# Patient Record
Sex: MALE | Race: WHITE | Employment: OTHER | URBAN - METROPOLITAN AREA
[De-identification: names, ages, dates, MRNs, and addresses within clinical notes are randomized per-mention and may not be internally consistent; named-entity substitution may affect disease eponyms.]

---

## 2019-11-05 LAB
ALBUMIN: 4.4 G/DL (ref 3.4–5)
ALP BLD-CCNC: 56 U/L (ref 33–136)
ALT SERPL-CCNC: 22 U/L (ref 10–52)
ANION GAP SERPL CALCULATED.3IONS-SCNC: 12 MMOL/L (ref 10–20)
AST SERPL-CCNC: 20 U/L (ref 9–39)
BICARBONATE: 28 MMOL/L (ref 21–32)
BILIRUB SERPL-MCNC: 0.6 MG/DL (ref 0–1.2)
CALCIUM SERPL-MCNC: 9.7 MG/DL (ref 8.6–10.3)
CHLORIDE BLD-SCNC: 104 MMOL/L (ref 98–107)
CHOLESTEROL/HDL RATIO: 3.3
CHOLESTEROL: 185 MG/DL (ref 0–199)
CREAT SERPL-MCNC: 1.14 MG/DL (ref 0.5–1.3)
GFR AFRICAN AMERICAN: >60 ML/MIN/1.73M2
GFR NON-AFRICAN AMERICAN: >60 ML/MIN/1.73M2
GLUCOSE: 101 MG/DL (ref 74–99)
HDLC SERPL-MCNC: 56 MG/DL
LDL CHOLESTEROL: 78 MG/DL (ref 0–99)
NON-HDL CHOLESTEROL: 129 MG/DL
POTASSIUM SERPL-SCNC: 4 MMOL/L (ref 3.5–5.3)
SODIUM BLD-SCNC: 140 MMOL/L (ref 136–145)
TOTAL PROTEIN: 7.5 G/DL (ref 6.4–8.2)
TRIGL SERPL-MCNC: 257 MG/DL (ref 0–149)
UREA NITROGEN: 18 MG/DL (ref 6–23)
VLDLC SERPL CALC-MCNC: 51 MG/DL (ref 0–40)

## 2020-11-18 ENCOUNTER — VIRTUAL VISIT (OUTPATIENT)
Dept: FAMILY MEDICINE CLINIC | Age: 80
End: 2020-11-18
Payer: MEDICARE

## 2020-11-18 PROCEDURE — 99441 PR PHYS/QHP TELEPHONE EVALUATION 5-10 MIN: CPT | Performed by: NURSE PRACTITIONER

## 2020-11-18 RX ORDER — AZITHROMYCIN 250 MG/1
250 TABLET, FILM COATED ORAL SEE ADMIN INSTRUCTIONS
Qty: 6 TABLET | Refills: 0 | Status: SHIPPED | OUTPATIENT
Start: 2020-11-18 | End: 2020-11-23

## 2020-11-18 RX ORDER — BENZONATATE 200 MG/1
200 CAPSULE ORAL 3 TIMES DAILY PRN
Qty: 30 CAPSULE | Refills: 0 | Status: SHIPPED | OUTPATIENT
Start: 2020-11-18 | End: 2020-11-25

## 2020-11-18 ASSESSMENT — ENCOUNTER SYMPTOMS
RHINORRHEA: 0
DIARRHEA: 0
SORE THROAT: 0
WHEEZING: 1
SHORTNESS OF BREATH: 0
VOMITING: 0
COUGH: 1
NAUSEA: 0

## 2020-11-18 NOTE — PROGRESS NOTES
of chest colds over the years   Hasnt taken his temp        Review of Systems   Constitutional: Positive for fatigue. Negative for chills and fever. HENT: Negative for congestion, rhinorrhea and sore throat. Respiratory: Positive for cough and wheezing (maybe a tiny bit). Negative for shortness of breath. Gastrointestinal: Negative for diarrhea, nausea and vomiting. Musculoskeletal: Negative for myalgias. Skin: Negative for rash. Neurological: Negative for headaches. Prior to Visit Medications    Medication Sig Taking? Authorizing Provider   azithromycin (ZITHROMAX) 250 MG tablet Take 1 tablet by mouth See Admin Instructions for 5 days 500mg on day 1 followed by 250mg on days 2 - 5 Yes ADAN Ledbetter CNP   benzonatate (TESSALON) 200 MG capsule Take 1 capsule by mouth 3 times daily as needed for Cough Yes ADAN Parnell CNP       No past medical history on file. No past surgical history on file.   Social History     Socioeconomic History    Marital status:      Spouse name: Not on file    Number of children: Not on file    Years of education: Not on file    Highest education level: Not on file   Occupational History    Not on file   Social Needs    Financial resource strain: Not on file    Food insecurity     Worry: Not on file     Inability: Not on file    Transportation needs     Medical: Not on file     Non-medical: Not on file   Tobacco Use    Smoking status: Not on file   Substance and Sexual Activity    Alcohol use: Not on file    Drug use: Not on file    Sexual activity: Not on file   Lifestyle    Physical activity     Days per week: Not on file     Minutes per session: Not on file    Stress: Not on file   Relationships    Social connections     Talks on phone: Not on file     Gets together: Not on file     Attends Sikh service: Not on file     Active member of club or organization: Not on file     Attends meetings of clubs or organizations: Not on file     Relationship status: Not on file    Intimate partner violence     Fear of current or ex partner: Not on file     Emotionally abused: Not on file     Physically abused: Not on file     Forced sexual activity: Not on file   Other Topics Concern    Not on file   Social History Narrative    Not on file     No family history on file. No Known Allergies    PMH, Surgical Hx, Family Hx, and Social Hx reviewed and updated. Health Maintenance reviewed. PHYSICAL EXAMINATION:  \"[x]\" Indicates a positive item  \"[]\" Indicates a negative item    Vital Signs: (As obtained by patient/caregiver or practitioner observation)    Blood pressure-  Heart rate-    Respiratory rate-    Temperature-  Pulse oximetry-     Constitutional: [] Appears well-developed and well-nourished [x] No apparent distress      [] Abnormal-   Mental status  [x] Alert and awake  [x] Oriented to person/place/time [x]Able to follow commands      Eyes:  EOM    []  Normal  [] Abnormal-  Sclera  []  Normal  [] Abnormal -         Discharge []  None visible  [] Abnormal -    HENT:   [] Normocephalic, atraumatic.   [] Abnormal   [] Mouth/Throat: Mucous membranes are moist.     External Ears [] Normal  [] Abnormal-     Neck: [] No visualized mass     Pulmonary/Chest: [] Respiratory effort normal.  [] No visualized signs of difficulty breathing or respiratory distress        [] Abnormal-      Musculoskeletal:   [] Normal gait with no signs of ataxia         [] Normal range of motion of neck        [] Abnormal-       Neurological:       [] No Facial Asymmetry (Cranial nerve 7 motor function) (limited exam to video visit)          [] No gaze palsy        [] Abnormal-         Skin:        [] No significant exanthematous lesions or discoloration noted on facial skin         [] Abnormal-            Psychiatric:       [x] Normal Affect [x] No Hallucinations        [] Abnormal-     Other pertinent observable physical exam findings-   Results

## 2020-11-19 ASSESSMENT — PATIENT HEALTH QUESTIONNAIRE - PHQ9
2. FEELING DOWN, DEPRESSED OR HOPELESS: 0
SUM OF ALL RESPONSES TO PHQ9 QUESTIONS 1 & 2: 0
1. LITTLE INTEREST OR PLEASURE IN DOING THINGS: 0
SUM OF ALL RESPONSES TO PHQ QUESTIONS 1-9: 0

## 2021-07-01 ENCOUNTER — HOSPITAL ENCOUNTER (OUTPATIENT)
Dept: PULMONOLOGY | Age: 81
Discharge: HOME | End: 2021-07-01
Payer: MEDICARE

## 2021-07-01 DIAGNOSIS — R06.02 SHORTNESS OF BREATH: ICD-10-CM

## 2021-07-01 PROCEDURE — 94726 PLETHYSMOGRAPHY LUNG VOLUMES: CPT

## 2021-07-01 PROCEDURE — 94060 EVALUATION OF WHEEZING: CPT

## 2021-07-01 PROCEDURE — 94729 DIFFUSING CAPACITY: CPT

## 2021-07-01 PROCEDURE — 6360000002 HC RX W HCPCS: Performed by: NURSE PRACTITIONER

## 2021-07-01 RX ORDER — ALBUTEROL SULFATE 2.5 MG/3ML
2.5 SOLUTION RESPIRATORY (INHALATION) ONCE
Status: COMPLETED | OUTPATIENT
Start: 2021-07-01 | End: 2021-07-01

## 2021-07-01 RX ADMIN — ALBUTEROL SULFATE 2.5 MG: 2.5 SOLUTION RESPIRATORY (INHALATION) at 13:49

## 2021-07-02 PROCEDURE — 94060 EVALUATION OF WHEEZING: CPT | Performed by: INTERNAL MEDICINE

## 2021-07-02 PROCEDURE — 94726 PLETHYSMOGRAPHY LUNG VOLUMES: CPT | Performed by: INTERNAL MEDICINE

## 2021-07-02 PROCEDURE — 94729 DIFFUSING CAPACITY: CPT | Performed by: INTERNAL MEDICINE

## 2021-07-02 NOTE — PROCEDURES
[address]                      [facility], [id number] [place] [address]                    PULMONARY FUNCTION  Ventura David   80 y.o.   male  Height 73 in  Weight 205 lb      Referring provider   ADAN Marcus Chi - *    Reading provider   Alon Jensen MD    Test meets ATS criteria for acceptability and reproducibility Yes    Diagnosis: LAMBERT: Yes  Cough   Yes, wheezing Yes  Smoking   quit 20 years ago    Spirometry   FVC            4.18 L   91%  Post bronchodilator 4.48 L  97% Change 7 %  FEV1          2.69 L  81%   Post bronchodilator  2.86 L  86%   Change 6%  FEV1/FVC  64  %             Post bronchodilator  63 %  EEV92-97% 1.31 L  56%  Post bronchodilator  1.72 L  74%   Change 30%    Lung volume   SVC           4.22 L  92%   RV              3.40 L 121%   TLC            7.62  L 99%   RV/TLC      44 %    DLCO           95 %     Test interpretation     Spirometry meets ATS criteria for mild obstructive airway disease with no significant response to bronchodilator,  Lung volume shows mild air trapping  Diffusion capacity is normal.       Clinical correlation is recommended     Alon Jensen MD [facility], 7/2/2021 5:30 PM

## 2021-07-22 ENCOUNTER — HOSPITAL ENCOUNTER (OUTPATIENT)
Dept: GENERAL RADIOLOGY | Age: 81
Discharge: HOME | End: 2021-07-24
Payer: MEDICARE

## 2021-07-22 ENCOUNTER — OFFICE VISIT (OUTPATIENT)
Dept: PULMONOLOGY | Age: 81
End: 2021-07-22
Payer: MEDICARE

## 2021-07-22 VITALS
HEIGHT: 70 IN | SYSTOLIC BLOOD PRESSURE: 132 MMHG | HEART RATE: 62 BPM | DIASTOLIC BLOOD PRESSURE: 66 MMHG | OXYGEN SATURATION: 98 % | BODY MASS INDEX: 30.29 KG/M2 | TEMPERATURE: 97.8 F | WEIGHT: 211.6 LBS

## 2021-07-22 DIAGNOSIS — E66.9 OBESITY (BMI 30-39.9): ICD-10-CM

## 2021-07-22 DIAGNOSIS — J44.9 CHRONIC OBSTRUCTIVE PULMONARY DISEASE, UNSPECIFIED COPD TYPE (HCC): Primary | ICD-10-CM

## 2021-07-22 DIAGNOSIS — Z87.891 PERSONAL HISTORY OF TOBACCO USE: ICD-10-CM

## 2021-07-22 DIAGNOSIS — J44.9 CHRONIC OBSTRUCTIVE PULMONARY DISEASE, UNSPECIFIED COPD TYPE (HCC): ICD-10-CM

## 2021-07-22 PROCEDURE — 4004F PT TOBACCO SCREEN RCVD TLK: CPT | Performed by: INTERNAL MEDICINE

## 2021-07-22 PROCEDURE — 3023F SPIROM DOC REV: CPT | Performed by: INTERNAL MEDICINE

## 2021-07-22 PROCEDURE — 99204 OFFICE O/P NEW MOD 45 MIN: CPT | Performed by: INTERNAL MEDICINE

## 2021-07-22 PROCEDURE — G8417 CALC BMI ABV UP PARAM F/U: HCPCS | Performed by: INTERNAL MEDICINE

## 2021-07-22 PROCEDURE — 71046 X-RAY EXAM CHEST 2 VIEWS: CPT

## 2021-07-22 PROCEDURE — 1123F ACP DISCUSS/DSCN MKR DOCD: CPT | Performed by: INTERNAL MEDICINE

## 2021-07-22 PROCEDURE — G8427 DOCREV CUR MEDS BY ELIG CLIN: HCPCS | Performed by: INTERNAL MEDICINE

## 2021-07-22 PROCEDURE — G8926 SPIRO NO PERF OR DOC: HCPCS | Performed by: INTERNAL MEDICINE

## 2021-07-22 PROCEDURE — 4040F PNEUMOC VAC/ADMIN/RCVD: CPT | Performed by: INTERNAL MEDICINE

## 2021-07-22 RX ORDER — MULTIVITAMIN
TABLET ORAL
COMMUNITY

## 2021-07-22 RX ORDER — SIMVASTATIN 40 MG
40 TABLET ORAL NIGHTLY
COMMUNITY

## 2021-07-22 RX ORDER — ALBUTEROL SULFATE 2.5 MG/3ML
2.5 SOLUTION RESPIRATORY (INHALATION) EVERY 6 HOURS PRN
COMMUNITY
End: 2022-05-04 | Stop reason: SDUPTHER

## 2021-07-22 RX ORDER — ASPIRIN 325 MG
325 TABLET ORAL DAILY
COMMUNITY

## 2021-07-22 RX ORDER — CARVEDILOL 3.12 MG/1
3.12 TABLET ORAL 2 TIMES DAILY WITH MEALS
COMMUNITY

## 2021-07-22 ASSESSMENT — ENCOUNTER SYMPTOMS
SORE THROAT: 0
COUGH: 1
WHEEZING: 1
RHINORRHEA: 0
SHORTNESS OF BREATH: 1
EYE ITCHING: 0
NAUSEA: 0
VOMITING: 0
DIARRHEA: 0
ABDOMINAL PAIN: 0
VOICE CHANGE: 0
CHEST TIGHTNESS: 0

## 2021-07-22 NOTE — PROGRESS NOTES
Subjective:     Will Dejesus is a 80 y.o. male who complains today of:     Chief Complaint   Patient presents with    New Patient     SOB coughing s/p covid       HPI  He had Covid19 in  November. [place]  And in dec 2020 to 3/21 in [place]. He is coughing in AM  And shortness of breath  Mostly lying flat but he feels can not take real deep breath . He also has wheezing . No h/o asthma or COPD, h/o smoking for 50 yrs . H/o smoking 1 ppd. He is on nebulizer with albuterol TID and pro air HFA prn   No Hemoptysis. C/o  Chest tightness. No Chest pain with radiation or pleuritic pain. No  leg edema. No orthopnea. No Fever or chills. No Rhinorrhea and postnasal drip. Allergies:  Patient has no known allergies. No past medical history on file. No past surgical history on file. No family history on file. Social History     Socioeconomic History    Marital status:      Spouse name: Not on file    Number of children: Not on file    Years of education: Not on file    Highest education level: Not on file   Occupational History    Not on file   Tobacco Use    Smoking status: Not on file   Substance and Sexual Activity    Alcohol use: Not on file    Drug use: Not on file    Sexual activity: Not on file   Other Topics Concern    Not on file   Social History Narrative    Not on file     Social Determinants of Health     Financial Resource Strain:     Difficulty of Paying Living Expenses:    Food Insecurity:     Worried About Running Out of Food in the Last Year:     [address] in the Last Year:    Transportation Needs:     Lack of Transportation (Medical):      Lack of Transportation (Non-Medical):    Physical Activity:     Days of Exercise per Week:     Minutes of Exercise per Session:    Stress:     Feeling of Stress :    Social Connections:     Frequency of Communication with Friends and Family:     Frequency of Social Gatherings with Friends and Family:     Attends Yarsanism Services:     Active Member of Clubs or Organizations:     Attends Club or Organization Meetings:     Marital Status:    Intimate Partner Violence:     Fear of Current or Ex-Partner:     Emotionally Abused:     Physically Abused:     Sexually Abused:          Review of Systems   Constitutional: Negative for chills, diaphoresis, fatigue and fever. HENT: Negative for congestion, mouth sores, nosebleeds, postnasal drip, rhinorrhea, sneezing, sore throat and voice change. Eyes: Negative for itching and visual disturbance. Respiratory: Positive for cough, shortness of breath and wheezing. Negative for chest tightness. Cardiovascular: Negative. Negative for chest pain, palpitations and leg swelling. Gastrointestinal: Negative for abdominal pain, diarrhea, nausea and vomiting. Genitourinary: Negative for difficulty urinating and hematuria. Musculoskeletal: Negative for arthralgias, joint swelling and myalgias. Skin: Negative for rash. Allergic/Immunologic: Negative for environmental allergies. Neurological: Negative for dizziness, tremors, weakness and headaches. Psychiatric/Behavioral: Negative for behavioral problems and sleep disturbance.         :     Vitals:    07/22/21 0850   BP: 132/66   Pulse: 62   Temp: 97.8 °F (36.6 °C)   SpO2: 98%   Weight: 211 lb 9.6 oz (96 kg)   Height: 5' 10\" (1.778 m)     Wt Readings from Last 3 Encounters:   07/22/21 211 lb 9.6 oz (96 kg)         Physical Exam  Constitutional:       Appearance: He is well-developed. HENT:      Head: Normocephalic and atraumatic. Nose: Nose normal.   Eyes:      Conjunctiva/sclera: Conjunctivae normal.      Pupils: Pupils are equal, round, and reactive to light. Neck:      Thyroid: No thyromegaly. Vascular: No JVD. Trachea: No tracheal deviation. Cardiovascular:      Rate and Rhythm: Normal rate and regular rhythm. Heart sounds: No murmur heard. No friction rub. No gallop.     Pulmonary:      Effort: Pulmonary effort is normal. No respiratory distress. Breath sounds: Normal breath sounds. No wheezing or rales. Comments: diminished Breath sound bilaterally. Chest:      Chest wall: No tenderness. Abdominal:      General: There is no distension. Musculoskeletal:         General: Normal range of motion. Lymphadenopathy:      Cervical: No cervical adenopathy. Skin:     General: Skin is warm and dry. Findings: No rash. Neurological:      Mental Status: He is alert and oriented to person, place, and time. Cranial Nerves: No cranial nerve deficit. Psychiatric:         Behavior: Behavior normal.         Current Outpatient Medications   Medication Sig Dispense Refill    carvedilol (COREG) 3.125 MG tablet Take 3.125 mg by mouth 2 times daily (with meals)      simvastatin (ZOCOR) 40 MG tablet Take 40 mg by mouth nightly      aspirin 325 MG tablet Take 325 mg by mouth daily      Multiple Vitamin (MULTI-VITAMIN DAILY) TABS Take by mouth      albuterol (PROVENTIL) (2.5 MG/3ML) 0.083% nebulizer solution Take 2.5 mg by nebulization every 6 hours as needed      Albuterol Sulfate (PROAIR HFA IN) Inhale into the lungs       No current facility-administered medications for this visit. Narrative   REASON FOR EXAMINATION  PATIENT WITH CHEST PAIN.        PORTABLE CHEST X-RAY       The heart size is at the upper limits of normal.  There is mild   tortuosity of the aorta.  There is slight hyperinflation of the lungs   which raises the possibility of mild COPD.  There is pleural and/or   parenchymal scarring in the left lung base.   There are no pulmonary   infiltrates or pleural effusions.  The remainder of the chest appears   unremarkable.        IMPRESSION  NO RADIOGRAPHIC EVIDENCE OF ACTIVE DISEASE IN THE CHEST.          -  RADWHERE        Read By- Kimberley Taylor   Released By- Kimberley Taylor   Released Date Time- 06/15/12 6783    This document has been electronically signed. Narrative    Jackie Moore MD     7/2/2021  5:32 PM                        [address]                      [address] [facility] [address]                      PULMONARY FUNCTION   Gina Boots   80 y.o.   male   Height 73 in   Weight 205 lb       Referring provider   ADAN Torres - *     Reading provider   Jackie Moore MD     Test meets ATS criteria for acceptability and reproducibility Yes     Diagnosis: LAMBERT: Yes  Cough   Yes, wheezing Yes   Smoking   quit 20 years ago     Spirometry   FVC            4.18 L   91%  Post bronchodilator 4.48 L  97%   Change 7 %   FEV1          2.69 L  81%   Post bronchodilator  2.86 L  86%     Change 6%   FEV1/FVC  64  %             Post bronchodilator  63 %   YFS83-09% 1.31 L  56%  Post bronchodilator  1.72 L  74%   Change   30%     Lung volume   SVC           4.22 L  92%   RV              3.40 L 121%   TLC            7.62  L 99%   RV/TLC      44 %     DLCO           95 %     Test interpretation       Spirometry meets ATS criteria for mild obstructive airway disease   with no significant response to bronchodilator,   Lung volume shows mild air trapping   Diffusion capacity is normal.         Clinical correlation is recommended     Jackie Moore MD [facility], 7/2/2021 5:30 PM          Assessment/Plan:     1. Chronic obstructive pulmonary disease, unspecified COPD type ([address]  He had Covid19 in  November. [place]  And in dec 2020 to 3/21 in [place]. He is coughing in AM  And shortness of breath  Mostly lying flat but he feels can not take real deep breath . He also has wheezing . C/o  Chest tightness. No h/o asthma , h/o smoking for 50 yrs . H/o smoking 1 ppd. He is on nebulizer with albuterol TID and pro air HFA prn   At this time continue bronchodilator as before , f/u after CXR and overnight pulse oxymetry  Before next visit. - Pulse oximetry, overnight; Future  - XR CHEST STANDARD (2 VW); Future    2. Personal history of tobacco use  H/o smoking for 50 yrs . H/o smoking 1 ppd. Quit 15 yrs. He was chewing tobacco, quit 3 month . 3. Obesity (BMI 30-39. 9)  Recommend PSG but does not want at this time he has daytime sleepiness, taking nap . Likely [facility]      Return in about 4 weeks (around 8/19/2021) for COPD, CXR result.       James Richard MD

## 2021-08-10 DIAGNOSIS — J44.9 CHRONIC OBSTRUCTIVE PULMONARY DISEASE, UNSPECIFIED COPD TYPE (HCC): ICD-10-CM

## 2021-08-11 ENCOUNTER — TELEPHONE (OUTPATIENT)
Dept: PULMONOLOGY | Age: 81
End: 2021-08-11

## 2021-08-26 ENCOUNTER — OFFICE VISIT (OUTPATIENT)
Dept: PULMONOLOGY | Age: 81
End: 2021-08-26
Payer: MEDICARE

## 2021-08-26 VITALS
HEIGHT: 70 IN | TEMPERATURE: 98.2 F | WEIGHT: 212 LBS | HEART RATE: 87 BPM | SYSTOLIC BLOOD PRESSURE: 130 MMHG | DIASTOLIC BLOOD PRESSURE: 61 MMHG | BODY MASS INDEX: 30.35 KG/M2 | OXYGEN SATURATION: 96 %

## 2021-08-26 DIAGNOSIS — J44.9 CHRONIC OBSTRUCTIVE PULMONARY DISEASE, UNSPECIFIED COPD TYPE (HCC): Primary | ICD-10-CM

## 2021-08-26 DIAGNOSIS — E66.9 OBESITY (BMI 30-39.9): ICD-10-CM

## 2021-08-26 DIAGNOSIS — Z87.891 PERSONAL HISTORY OF TOBACCO USE: ICD-10-CM

## 2021-08-26 PROCEDURE — G8926 SPIRO NO PERF OR DOC: HCPCS | Performed by: INTERNAL MEDICINE

## 2021-08-26 PROCEDURE — 4004F PT TOBACCO SCREEN RCVD TLK: CPT | Performed by: INTERNAL MEDICINE

## 2021-08-26 PROCEDURE — 1123F ACP DISCUSS/DSCN MKR DOCD: CPT | Performed by: INTERNAL MEDICINE

## 2021-08-26 PROCEDURE — G8427 DOCREV CUR MEDS BY ELIG CLIN: HCPCS | Performed by: INTERNAL MEDICINE

## 2021-08-26 PROCEDURE — 99214 OFFICE O/P EST MOD 30 MIN: CPT | Performed by: INTERNAL MEDICINE

## 2021-08-26 PROCEDURE — G8417 CALC BMI ABV UP PARAM F/U: HCPCS | Performed by: INTERNAL MEDICINE

## 2021-08-26 PROCEDURE — 3023F SPIROM DOC REV: CPT | Performed by: INTERNAL MEDICINE

## 2021-08-26 PROCEDURE — 4040F PNEUMOC VAC/ADMIN/RCVD: CPT | Performed by: INTERNAL MEDICINE

## 2021-08-26 ASSESSMENT — ENCOUNTER SYMPTOMS
VOICE CHANGE: 0
ABDOMINAL PAIN: 0
COUGH: 1
VOMITING: 0
WHEEZING: 1
SORE THROAT: 0
DIARRHEA: 0
RHINORRHEA: 0
CHEST TIGHTNESS: 0
EYE ITCHING: 0
NAUSEA: 0
SHORTNESS OF BREATH: 1

## 2021-08-26 NOTE — PROGRESS NOTES
Subjective:     Minerva Taylor is a 80 y.o. male who complains today of:     Chief Complaint   Patient presents with    COPD     4 week f/u       HPI  He is on nebulizer with albuterol QID and pro air HFA prn   C/o shortness of breath  with exertion. Occasional Wheezing. Cough with  Clear Sputum in AM .  No Hemoptysis. No Chest tightness. No Chest pain with radiation  or pleuritic pain. No  leg edema. No orthopnea. No Fever or chills. No Rhinorrhea and postnasal drip. He quit smoking 20 yrs ago and   He had Covid19 in  November. [place]  And in dec 2020 to 3/21 in [place]. Allergies:  Patient has no known allergies. No past medical history on file. No past surgical history on file. No family history on file. Social History     Socioeconomic History    Marital status:      Spouse name: Not on file    Number of children: Not on file    Years of education: Not on file    Highest education level: Not on file   Occupational History    Not on file   Tobacco Use    Smoking status: Not on file   Substance and Sexual Activity    Alcohol use: Not on file    Drug use: Not on file    Sexual activity: Not on file   Other Topics Concern    Not on file   Social History Narrative    Not on file     Social Determinants of Health     Financial Resource Strain:     Difficulty of Paying Living Expenses:    Food Insecurity:     Worried About Running Out of Food in the Last Year:     [address] in the Last Year:    Transportation Needs:     Lack of Transportation (Medical):      Lack of Transportation (Non-Medical):    Physical Activity:     Days of Exercise per Week:     Minutes of Exercise per Session:    Stress:     Feeling of Stress :    Social Connections:     Frequency of Communication with Friends and Family:     Frequency of Social Gatherings with Friends and Family:     Attends Mandaen Services:     Active Member of Clubs or Organizations:     Attends Club or Organization Meetings:     Marital Status:    Intimate Partner Violence:     Fear of Current or Ex-Partner:     Emotionally Abused:     Physically Abused:     Sexually Abused:          Review of Systems   Constitutional: Negative for chills, diaphoresis, fatigue and fever. HENT: Negative for congestion, mouth sores, nosebleeds, postnasal drip, rhinorrhea, sneezing, sore throat and voice change. Eyes: Negative for itching and visual disturbance. Respiratory: Positive for cough, shortness of breath and wheezing. Negative for chest tightness. Cardiovascular: Negative. Negative for chest pain, palpitations and leg swelling. Gastrointestinal: Negative for abdominal pain, diarrhea, nausea and vomiting. Genitourinary: Negative for difficulty urinating and hematuria. Musculoskeletal: Negative for arthralgias, joint swelling and myalgias. Skin: Negative for rash. Allergic/Immunologic: Negative for environmental allergies. Neurological: Negative for dizziness, tremors, weakness and headaches. Psychiatric/Behavioral: Negative for behavioral problems and sleep disturbance.         :     Vitals:    08/26/21 1515   BP: 130/61   Pulse: 87   Temp: 98.2 °F (36.8 °C)   SpO2: 96%   Weight: 212 lb (96.2 kg)   Height: 5' 10\" (1.778 m)     Wt Readings from Last 3 Encounters:   08/26/21 212 lb (96.2 kg)   07/22/21 211 lb 9.6 oz (96 kg)         Physical Exam  Constitutional:       Appearance: He is well-developed. HENT:      Head: Normocephalic and atraumatic. Nose: Nose normal.   Eyes:      Conjunctiva/sclera: Conjunctivae normal.      Pupils: Pupils are equal, round, and reactive to light. Neck:      Thyroid: No thyromegaly. Vascular: No JVD. Trachea: No tracheal deviation. Cardiovascular:      Rate and Rhythm: Normal rate and regular rhythm. Heart sounds: No murmur heard. No friction rub. No gallop. Pulmonary:      Effort: Pulmonary effort is normal. No respiratory distress. Breath sounds: Wheezing present. No rales. Comments: diminished Breath sound bilaterally. Chest:      Chest wall: No tenderness. Abdominal:      General: There is no distension. Musculoskeletal:         General: Normal range of motion. Lymphadenopathy:      Cervical: No cervical adenopathy. Skin:     General: Skin is warm and dry. Findings: No rash. Neurological:      Mental Status: He is alert and oriented to person, place, and time. Cranial Nerves: No cranial nerve deficit. Psychiatric:         Behavior: Behavior normal.         Current Outpatient Medications   Medication Sig Dispense Refill    OXYGEN Start oxygen therapy 3 lit with sleep for  Nocturnal hypoxia      Dx COPD 1 Units 0    carvedilol (COREG) 3.125 MG tablet Take 3.125 mg by mouth 2 times daily (with meals)      simvastatin (ZOCOR) 40 MG tablet Take 40 mg by mouth nightly      aspirin 325 MG tablet Take 325 mg by mouth daily      Multiple Vitamin (MULTI-VITAMIN DAILY) TABS Take by mouth      albuterol (PROVENTIL) (2.5 MG/3ML) 0.083% nebulizer solution Take 2.5 mg by nebulization every 6 hours as needed      Albuterol Sulfate (PROAIR HFA IN) Inhale into the lungs       No current facility-administered medications for this visit.      Flavio Jo MD     7/2/2021  5:32 PM                        [address]                      [address] [place], [place] [address]                      PULMONARY FUNCTION   Merced Carrizales   80 y.o.   male   Height 73 in   Weight 205 lb       Referring provider   ADAN Childs - *     Reading provider   Flavio Jo MD     Test meets ATS criteria for acceptability and reproducibility Yes     Diagnosis: LAMBERT: Yes  Cough   Yes, wheezing Yes   Smoking   quit 20 years ago     Spirometry   FVC            4.18 L   91%  Post bronchodilator 4.48 L  97%   Change 7 %   FEV1          2.69 L  81%   Post bronchodilator  2.86 L  86%     Change 6%   FEV1/FVC  64  %             Post bronchodilator  63 %   LJR08-16% 1.31 L  56%  Post bronchodilator  1.72 L  74%   Change   30%     Lung volume   SVC           4.22 L  92%   RV              3.40 L 121%   TLC            7.62  L 99%   RV/TLC      44 %     DLCO           95 %     Test interpretation       Spirometry meets ATS criteria for mild obstructive airway disease   with no significant response to bronchodilator,   Lung volume shows mild air trapping   Diffusion capacity is normal.       Results for orders placed during the hospital encounter of 07/22/21    XR CHEST (2 VW)    Narrative  XR CHEST (2 VW)    Clinical History:  COPD. Shortness of breath. Comparison:  6/13/2012. RESULT:    Hyperinflated lungs with flattening of the diaphragms. Coarsened lung markings. No consolidation. No pleural effusion. No pneumothorax. Stable cardiomediastinal silhouette. No acute osseous findings. Impression  No acute radiographic abnormality. Assessment/Plan:     1. Chronic obstructive pulmonary disease, unspecified COPD type ([address]  He is on nebulizer with albuterol QID and pro air HFA prn. C/o shortness of breath  with exertion. Occasional Wheezing. Cough with  Clear Sputum in AM He had Covid19 in  November. [place]  And in dec 2020 to 3/21 in [place]. .  Chest x-ray on 7/22/2021 shows no acute cardiopulmonary disease. PFT shows mild obstructive pulmonary disease. Is overall doing much better but still having wheezing and cough along with exertional shortness of breath. He will be started on trilogy Ellipta 1 puff daily and sample given from office. If it works for him then he will have prescription. Start Trelegy  ellipta 1 puff daily. ringe mouth after use. 2. Personal history of tobacco use  He quit smoking 20 yrs ago and     3. Obesity (BMI 30-39. 9)  He is advised try to lose weight. obesity related risk explained to the patient ,  Current weight:  212 lb (96.2 kg) Lbs. BMI:  Body mass index is 30.42 kg/m².   Suggested weight control approaches, including dietary changes , exercise, behavioral modification. Return in about 4 weeks (around 9/23/2021).       Onel Zhou MD

## 2021-09-29 ENCOUNTER — OFFICE VISIT (OUTPATIENT)
Dept: PULMONOLOGY | Age: 81
End: 2021-09-29
Payer: MEDICARE

## 2021-09-29 VITALS
BODY MASS INDEX: 29.06 KG/M2 | SYSTOLIC BLOOD PRESSURE: 98 MMHG | WEIGHT: 203 LBS | HEIGHT: 70 IN | HEART RATE: 82 BPM | TEMPERATURE: 98.2 F | DIASTOLIC BLOOD PRESSURE: 53 MMHG | OXYGEN SATURATION: 96 %

## 2021-09-29 DIAGNOSIS — E66.9 OBESITY (BMI 30-39.9): ICD-10-CM

## 2021-09-29 DIAGNOSIS — R09.02 HYPOXIA: ICD-10-CM

## 2021-09-29 DIAGNOSIS — J44.9 CHRONIC OBSTRUCTIVE PULMONARY DISEASE, UNSPECIFIED COPD TYPE (HCC): Primary | ICD-10-CM

## 2021-09-29 DIAGNOSIS — Z87.891 PERSONAL HISTORY OF TOBACCO USE: ICD-10-CM

## 2021-09-29 PROCEDURE — 3023F SPIROM DOC REV: CPT | Performed by: INTERNAL MEDICINE

## 2021-09-29 PROCEDURE — G8427 DOCREV CUR MEDS BY ELIG CLIN: HCPCS | Performed by: INTERNAL MEDICINE

## 2021-09-29 PROCEDURE — 4040F PNEUMOC VAC/ADMIN/RCVD: CPT | Performed by: INTERNAL MEDICINE

## 2021-09-29 PROCEDURE — G8926 SPIRO NO PERF OR DOC: HCPCS | Performed by: INTERNAL MEDICINE

## 2021-09-29 PROCEDURE — 1123F ACP DISCUSS/DSCN MKR DOCD: CPT | Performed by: INTERNAL MEDICINE

## 2021-09-29 PROCEDURE — 99214 OFFICE O/P EST MOD 30 MIN: CPT | Performed by: INTERNAL MEDICINE

## 2021-09-29 PROCEDURE — 4004F PT TOBACCO SCREEN RCVD TLK: CPT | Performed by: INTERNAL MEDICINE

## 2021-09-29 PROCEDURE — G8417 CALC BMI ABV UP PARAM F/U: HCPCS | Performed by: INTERNAL MEDICINE

## 2021-09-29 ASSESSMENT — ENCOUNTER SYMPTOMS
ABDOMINAL PAIN: 0
DIARRHEA: 0
EYE ITCHING: 0
VOMITING: 0
COUGH: 1
NAUSEA: 0
RHINORRHEA: 0
SORE THROAT: 0
VOICE CHANGE: 0
SHORTNESS OF BREATH: 1
WHEEZING: 1
CHEST TIGHTNESS: 0

## 2021-09-29 NOTE — PROGRESS NOTES
Subjective:     Paco Arce is a 80 y.o. male who complains today of:     Chief Complaint   Patient presents with    COPD     4 week f/u       HPI  He is on nebulizer with albuterol QID and pro air HFA prn.     he is on 3 lit  With sleep . He was started  On trelegy  ellipta 1 puff  Daily  It help, sample. C/o shortness of breath with exertion. Occasional Wheezing. No Hemoptysis. No Chest tightness. No Chest pain with radiation  or pleuritic pain. No  leg edema. No orthopnea. No Fever or chills. No Rhinorrhea and postnasal drip. Allergies:  Patient has no known allergies. No past medical history on file. No past surgical history on file. No family history on file. Social History     Socioeconomic History    Marital status:      Spouse name: Not on file    Number of children: Not on file    Years of education: Not on file    Highest education level: Not on file   Occupational History    Not on file   Tobacco Use    Smoking status: Not on file   Substance and Sexual Activity    Alcohol use: Not on file    Drug use: Not on file    Sexual activity: Not on file   Other Topics Concern    Not on file   Social History Narrative    Not on file     Social Determinants of Health     Financial Resource Strain:     Difficulty of Paying Living Expenses:    Food Insecurity:     Worried About Running Out of Food in the Last Year:     [address] in the Last Year:    Transportation Needs:     Lack of Transportation (Medical):      Lack of Transportation (Non-Medical):    Physical Activity:     Days of Exercise per Week:     Minutes of Exercise per Session:    Stress:     Feeling of Stress :    Social Connections:     Frequency of Communication with Friends and Family:     Frequency of Social Gatherings with Friends and Family:     Attends [employer] Services:     Active Member of Clubs or Organizations:     Attends Club or Organization Meetings:     Marital Status: Intimate Partner Violence:     Fear of Current or Ex-Partner:     Emotionally Abused:     Physically Abused:     Sexually Abused:          Review of Systems   Constitutional: Negative for chills, diaphoresis, fatigue and fever. HENT: Negative for congestion, mouth sores, nosebleeds, postnasal drip, rhinorrhea, sneezing, sore throat and voice change. Eyes: Negative for itching and visual disturbance. Respiratory: Positive for cough, shortness of breath and wheezing. Negative for chest tightness. Cardiovascular: Negative. Negative for chest pain, palpitations and leg swelling. Gastrointestinal: Negative for abdominal pain, diarrhea, nausea and vomiting. Genitourinary: Negative for difficulty urinating and hematuria. Musculoskeletal: Negative for arthralgias, joint swelling and myalgias. Skin: Negative for rash. Allergic/Immunologic: Negative for environmental allergies. Neurological: Negative for dizziness, tremors, weakness and headaches. Psychiatric/Behavioral: Negative for behavioral problems and sleep disturbance.         :     Vitals:    09/29/21 1540   BP: (!) 98/53   Pulse: 82   Temp: 98.2 °F (36.8 °C)   SpO2: 96%   Weight: 203 lb (92.1 kg)   Height: 5' 10\" (1.778 m)     Wt Readings from Last 3 Encounters:   09/29/21 203 lb (92.1 kg)   08/26/21 212 lb (96.2 kg)   07/22/21 211 lb 9.6 oz (96 kg)         Physical Exam  Constitutional:       Appearance: He is well-developed. HENT:      Head: Normocephalic and atraumatic. Nose: Nose normal.   Eyes:      Conjunctiva/sclera: Conjunctivae normal.      Pupils: Pupils are equal, round, and reactive to light. Neck:      Thyroid: No thyromegaly. Vascular: No JVD. Trachea: No tracheal deviation. Cardiovascular:      Rate and Rhythm: Normal rate and regular rhythm. Heart sounds: No murmur heard. No friction rub. No gallop. Pulmonary:      Effort: Pulmonary effort is normal. No respiratory distress.       Breath sounds: Normal breath sounds. No wheezing or rales. Chest:      Chest wall: No tenderness. Abdominal:      General: There is no distension. Musculoskeletal:         General: Normal range of motion. Lymphadenopathy:      Cervical: No cervical adenopathy. Skin:     General: Skin is warm and dry. Findings: No rash. Neurological:      Mental Status: He is alert and oriented to person, place, and time. Cranial Nerves: No cranial nerve deficit. Psychiatric:         Behavior: Behavior normal.         Current Outpatient Medications   Medication Sig Dispense Refill    fluticasone-umeclidin-vilant (TRELEGY ELLIPTA) 100-62.5-25 MCG/INH AEPB Inhale 1 puff into the lungs daily 1 each 3    OXYGEN Start oxygen therapy 3 lit with sleep for  Nocturnal hypoxia      Dx COPD 1 Units 0    carvedilol (COREG) 3.125 MG tablet Take 3.125 mg by mouth 2 times daily (with meals)      simvastatin (ZOCOR) 40 MG tablet Take 40 mg by mouth nightly      aspirin 325 MG tablet Take 325 mg by mouth daily      Multiple Vitamin (MULTI-VITAMIN DAILY) TABS Take by mouth      albuterol (PROVENTIL) (2.5 MG/3ML) 0.083% nebulizer solution Take 2.5 mg by nebulization every 6 hours as needed      Albuterol Sulfate (PROAIR HFA IN) Inhale into the lungs       No current facility-administered medications for this visit. Results for orders placed during the hospital encounter of 07/22/21    XR CHEST (2 VW)    Narrative  XR CHEST (2 VW)    Clinical History:  COPD. Shortness of breath. Comparison:  6/13/2012. RESULT:    Hyperinflated lungs with flattening of the diaphragms. Coarsened lung markings. No consolidation. No pleural effusion. No pneumothorax. Stable cardiomediastinal silhouette. No acute osseous findings. Impression  No acute radiographic abnormality. Assessment/Plan:     1.  Chronic obstructive pulmonary disease, unspecified COPD type ([address]  He is on nebulizer with albuterol QID and pro air HFA prn.  he is on 3 lit  With sleep . He was started  On trelegy  ellipta 1 puff  Daily  It help, sample. C/o shortness of breath with exertion. Occasional Wheezing. No Hemoptysis. - fluticasone-umeclidin-vilant (Jolly Wiggins) 100-62.5-25 MCG/INH AEPB; Inhale 1 puff into the lungs daily  Dispense: 1 each; Refill: 3    2. Personal history of tobacco use  He is advised try to quit smoking. Risks related to smoking explained to the patient. Different ways to help to quit smoking were discussed today. 3. Obesity (BMI 30-39. 9)  He is advised try to lose weight. obesity related risk explained to the patient ,  Current weight:  203 lb (92.1 kg) Lbs. BMI:  Body mass index is 29.13 kg/m². Suggested weight control approaches, including dietary changes , exercise, behavioral modification. 4. Hypoxia  Is on 3 L O2 with sleep and prn. Continue O2 to keep saturation 90% level    Return in about 7 months (around 4/29/2022) for COPD.       aMryana Mcallister MD

## 2022-05-04 ENCOUNTER — TELEMEDICINE (OUTPATIENT)
Dept: PULMONOLOGY | Age: 82
End: 2022-05-04
Payer: MEDICARE

## 2022-05-04 DIAGNOSIS — E66.9 OBESITY (BMI 30-39.9): ICD-10-CM

## 2022-05-04 DIAGNOSIS — J44.9 CHRONIC OBSTRUCTIVE PULMONARY DISEASE, UNSPECIFIED COPD TYPE (HCC): Primary | ICD-10-CM

## 2022-05-04 DIAGNOSIS — Z87.891 PERSONAL HISTORY OF TOBACCO USE: ICD-10-CM

## 2022-05-04 DIAGNOSIS — R09.02 HYPOXIA: ICD-10-CM

## 2022-05-04 PROCEDURE — 99443 PR PHYS/QHP TELEPHONE EVALUATION 21-30 MIN: CPT | Performed by: INTERNAL MEDICINE

## 2022-05-04 RX ORDER — ALBUTEROL SULFATE 90 UG/1
2 AEROSOL, METERED RESPIRATORY (INHALATION) EVERY 6 HOURS PRN
Qty: 18 G | Refills: 3 | Status: SHIPPED | OUTPATIENT
Start: 2022-05-04

## 2022-05-04 RX ORDER — ALBUTEROL SULFATE 2.5 MG/3ML
2.5 SOLUTION RESPIRATORY (INHALATION) EVERY 6 HOURS PRN
Qty: 120 EACH | Refills: 3 | Status: SHIPPED | OUTPATIENT
Start: 2022-05-04

## 2022-05-04 ASSESSMENT — ENCOUNTER SYMPTOMS
VOMITING: 0
SORE THROAT: 0
ABDOMINAL PAIN: 0
NAUSEA: 0
CHEST TIGHTNESS: 0
EYE ITCHING: 0
RHINORRHEA: 0
DIARRHEA: 0
SHORTNESS OF BREATH: 1
COUGH: 1
WHEEZING: 0
VOICE CHANGE: 0

## 2022-05-04 NOTE — PROGRESS NOTES
Subjective:     Daniel Swan is a [de-identified] y.o. male who complains today of:     Chief Complaint   Patient presents with    COPD     7 month f/u     Patient and/or health care decision maker is aware that that he/she may receive a bill for this telephone service, depending on his insurance coverage, and has provided verbal consent to proceed. HPI  He is on nebulizer with albuterol QID and pro air HFA prn. He is on 3 lit  With sleep . C/o shortness of breath with exertion. No Wheezing. C/o Cough with clear mucus  No Hemoptysis. No Chest tightness. No Chest pain with radiation  or pleuritic pain. No  leg edema. No orthopnea. No Fever or chills. No Rhinorrhea and postnasal drip.       Allergies:  Patient has no known allergies. No past medical history on file. No past surgical history on file. No family history on file. Social History     Socioeconomic History    Marital status:      Spouse name: Not on file    Number of children: Not on file    Years of education: Not on file    Highest education level: Not on file   Occupational History    Not on file   Tobacco Use    Smoking status: Not on file    Smokeless tobacco: Not on file   Substance and Sexual Activity    Alcohol use: Not on file    Drug use: Not on file    Sexual activity: Not on file   Other Topics Concern    Not on file   Social History Narrative    Not on file     Social Determinants of Health     Financial Resource Strain:     Difficulty of Paying Living Expenses: Not on file   Food Insecurity:     Worried About Running Out of Food in the Last Year: Not on file    Abdullahi of Food in the Last Year: Not on file   Transportation Needs:     Lack of Transportation (Medical): Not on file    Lack of Transportation (Non-Medical):  Not on file   Physical Activity:     Days of Exercise per Week: Not on file    Minutes of Exercise per Session: Not on file   Stress:     Feeling of Stress : Not on file   Social Connections:     Frequency of Communication with Friends and Family: Not on file    Frequency of Social Gatherings with Friends and Family: Not on file    Attends [employer] Services: Not on file    Active Member of Clubs or Organizations: Not on file    Attends Club or Organization Meetings: Not on file    Marital Status: Not on file   Intimate Partner Violence:     Fear of Current or Ex-Partner: Not on file    Emotionally Abused: Not on file    Physically Abused: Not on file    Sexually Abused: Not on file   Housing Stability:     Unable to Pay for Housing in the Last Year: Not on file    Number of [place] in the Last Year: Not on file    Unstable Housing in the Last Year: Not on file         Review of Systems   Constitutional: Negative for chills, diaphoresis, fatigue and fever. HENT: Negative for congestion, mouth sores, nosebleeds, postnasal drip, rhinorrhea, sneezing, sore throat and voice change. Eyes: Negative for itching and visual disturbance. Respiratory: Positive for cough and shortness of breath. Negative for chest tightness and wheezing. Cardiovascular: Negative. Negative for chest pain, palpitations and leg swelling. Gastrointestinal: Negative for abdominal pain, diarrhea, nausea and vomiting. Genitourinary: Negative for difficulty urinating and hematuria. Musculoskeletal: Negative for arthralgias, joint swelling and myalgias. Skin: Negative for rash. Allergic/Immunologic: Negative for environmental allergies. Neurological: Negative for dizziness, tremors, weakness and headaches. Psychiatric/Behavioral: Negative for behavioral problems and sleep disturbance.         :   There were no vitals filed for this visit.   Wt Readings from Last 3 Encounters:   09/29/21 203 lb (92.1 kg)   08/26/21 212 lb (96.2 kg)   07/22/21 211 lb 9.6 oz (96 kg)         Physical Exam phone visit     Current Outpatient Medications   Medication Sig Dispense Refill    albuterol sulfate HFA (PROAIR HFA) 108 (90 Base) MCG/ACT inhaler Inhale 2 puffs into the lungs every 6 hours as needed for Wheezing or Shortness of Breath 18 g 3    albuterol (PROVENTIL) (2.5 MG/3ML) 0.083% nebulizer solution Take 3 mLs by nebulization every 6 hours as needed for Wheezing 120 each 3    fluticasone-umeclidin-vilant (TRELEGY ELLIPTA) 100-62.5-25 MCG/INH AEPB Inhale 1 puff into the lungs daily 1 each 3    OXYGEN Start oxygen therapy 3 lit with sleep for  Nocturnal hypoxia      Dx COPD 1 Units 0    carvedilol (COREG) 3.125 MG tablet Take 3.125 mg by mouth 2 times daily (with meals)      simvastatin (ZOCOR) 40 MG tablet Take 40 mg by mouth nightly      aspirin 325 MG tablet Take 325 mg by mouth daily      Multiple Vitamin (MULTI-VITAMIN DAILY) TABS Take by mouth       No current facility-administered medications for this visit. Results for orders placed during the hospital encounter of 07/22/21    XR CHEST (2 VW)    Narrative  XR CHEST (2 VW)    Clinical History:  COPD. Shortness of breath. Comparison:  6/13/2012. RESULT:    Hyperinflated lungs with flattening of the diaphragms. Coarsened lung markings. No consolidation. No pleural effusion. No pneumothorax. Stable cardiomediastinal silhouette. No acute osseous findings. Impression  No acute radiographic abnormality. Assessment/Plan:     1. Chronic obstructive pulmonary disease, unspecified COPD type ([address]  He is on nebulizer with albuterol QID and pro air HFA prn. He is on 3 lit  With sleep . C/o shortness of breath with exertion. No Wheezing. C/o Cough with clear mucus. Continue same      - albuterol sulfate HFA (PROAIR HFA) 108 (90 Base) MCG/ACT inhaler; Inhale 2 puffs into the lungs every 6 hours as needed for Wheezing or Shortness of Breath  Dispense: 18 g; Refill: 3  - albuterol (PROVENTIL) (2.5 MG/3ML) 0.083% nebulizer solution; Take 3 mLs by nebulization every 6 hours as needed for Wheezing  Dispense: 120 each; Refill: 3    2. Obesity (BMI 30-39. 9)  He is advised try to lose weight. obesity related risk explained to the patient ,  Suggested weight control approaches, including dietary changes , exercise, behavioral modification. 3. Hypoxia  He is on 3 lit O2 with sleep , continue O2 to keep Spo2 90% or above. Patient notified that this is a billable service and has given verbal consent for telehealth services. Time spent with patient    21 minutes. Return in about 3 months (around 8/4/2022) for COPD, hypoxia on O2.       Eve Anaya MD

## 2022-08-04 ENCOUNTER — OFFICE VISIT (OUTPATIENT)
Dept: PULMONOLOGY | Age: 82
End: 2022-08-04
Payer: MEDICARE

## 2022-08-04 VITALS
HEART RATE: 77 BPM | SYSTOLIC BLOOD PRESSURE: 132 MMHG | OXYGEN SATURATION: 98 % | DIASTOLIC BLOOD PRESSURE: 74 MMHG | WEIGHT: 198 LBS | BODY MASS INDEX: 28.35 KG/M2 | TEMPERATURE: 97.8 F | HEIGHT: 70 IN

## 2022-08-04 DIAGNOSIS — R09.02 HYPOXIA: ICD-10-CM

## 2022-08-04 DIAGNOSIS — J44.9 CHRONIC OBSTRUCTIVE PULMONARY DISEASE, UNSPECIFIED COPD TYPE (HCC): Primary | ICD-10-CM

## 2022-08-04 DIAGNOSIS — R06.02 SHORTNESS OF BREATH: ICD-10-CM

## 2022-08-04 PROCEDURE — 1123F ACP DISCUSS/DSCN MKR DOCD: CPT | Performed by: INTERNAL MEDICINE

## 2022-08-04 PROCEDURE — 99213 OFFICE O/P EST LOW 20 MIN: CPT | Performed by: INTERNAL MEDICINE

## 2022-08-04 PROCEDURE — 3023F SPIROM DOC REV: CPT | Performed by: INTERNAL MEDICINE

## 2022-08-04 PROCEDURE — 4004F PT TOBACCO SCREEN RCVD TLK: CPT | Performed by: INTERNAL MEDICINE

## 2022-08-04 PROCEDURE — G8417 CALC BMI ABV UP PARAM F/U: HCPCS | Performed by: INTERNAL MEDICINE

## 2022-08-04 PROCEDURE — G8428 CUR MEDS NOT DOCUMENT: HCPCS | Performed by: INTERNAL MEDICINE

## 2022-08-04 ASSESSMENT — ENCOUNTER SYMPTOMS
SHORTNESS OF BREATH: 1
NAUSEA: 0
RHINORRHEA: 0
VOICE CHANGE: 0
ABDOMINAL PAIN: 0
WHEEZING: 1
SORE THROAT: 0
DIARRHEA: 0
COUGH: 1
VOMITING: 0
EYE ITCHING: 0
CHEST TIGHTNESS: 0

## 2022-08-04 NOTE — PROGRESS NOTES
Subjective:     Ruba Tsai is a [de-identified] y.o. male who complains today of:     Chief Complaint   Patient presents with    COPD    Follow-up     3 month       HPI  He is on 3 lit  With sleep . He is on nebulizer with albuterol TID or QID and pro air HFA prn.    C/o shortness of breath with exertion. C/o mild Wheezing. C/o Cough with white mucus  No Hemoptysis. No Chest tightness. No Chest pain with radiation  or pleuritic pain. No  leg edema. No orthopnea. No Fever or chills. No Rhinorrhea and postnasal drip. Allergies:  Patient has no known allergies. No past medical history on file. No past surgical history on file. No family history on file. Social History     Socioeconomic History    Marital status:      Spouse name: Not on file    Number of children: Not on file    Years of education: Not on file    Highest education level: Not on file   Occupational History    Not on file   Tobacco Use    Smoking status: Not on file    Smokeless tobacco: Not on file   Substance and Sexual Activity    Alcohol use: Not on file    Drug use: Not on file    Sexual activity: Not on file   Other Topics Concern    Not on file   Social History Narrative    Not on file     Social Determinants of Health     Financial Resource Strain: Not on file   Food Insecurity: Not on file   Transportation Needs: Not on file   Physical Activity: Not on file   Stress: Not on file   Social Connections: Not on file   Intimate Partner Violence: Not on file   Housing Stability: Not on file         Review of Systems   Constitutional:  Negative for chills, diaphoresis, fatigue and fever. HENT:  Negative for congestion, mouth sores, nosebleeds, postnasal drip, rhinorrhea, sneezing, sore throat and voice change. Eyes:  Negative for itching and visual disturbance. Respiratory:  Positive for cough, shortness of breath and wheezing. Negative for chest tightness. Cardiovascular: Negative.   Negative for chest pain, palpitations and leg swelling. Gastrointestinal:  Negative for abdominal pain, diarrhea, nausea and vomiting. Genitourinary:  Negative for difficulty urinating and hematuria. Musculoskeletal:  Negative for arthralgias, joint swelling and myalgias. Skin:  Negative for rash. Allergic/Immunologic: Negative for environmental allergies. Neurological:  Negative for dizziness, tremors, weakness and headaches. Psychiatric/Behavioral:  Negative for behavioral problems and sleep disturbance.        :     Vitals:    08/04/22 1403   BP: 132/74   Site: Left Upper Arm   Pulse: 77   Temp: 97.8 °F (36.6 °C)   TempSrc: Tympanic   SpO2: 98%   Weight: 198 lb (89.8 kg)   Height: 5' 10\" (1.778 m)     Wt Readings from Last 3 Encounters:   08/04/22 198 lb (89.8 kg)   09/29/21 203 lb (92.1 kg)   08/26/21 212 lb (96.2 kg)         Physical Exam  Constitutional:       Appearance: He is well-developed. HENT:      Head: Normocephalic and atraumatic. Nose: Nose normal.   Eyes:      Conjunctiva/sclera: Conjunctivae normal.      Pupils: Pupils are equal, round, and reactive to light. Neck:      Thyroid: No thyromegaly. Vascular: No JVD. Trachea: No tracheal deviation. Cardiovascular:      Rate and Rhythm: Normal rate and regular rhythm. Heart sounds: No murmur heard. No friction rub. No gallop. Pulmonary:      Effort: Pulmonary effort is normal. No respiratory distress. Breath sounds: Normal breath sounds. No wheezing or rales. Comments: diminished Breath sound bilaterally. Chest:      Chest wall: No tenderness. Abdominal:      General: There is no distension. Musculoskeletal:         General: Normal range of motion. Lymphadenopathy:      Cervical: No cervical adenopathy. Skin:     General: Skin is warm and dry. Findings: No rash. Neurological:      Mental Status: He is alert and oriented to person, place, and time. Cranial Nerves: No cranial nerve deficit.    Psychiatric: Behavior: Behavior normal.       Current Outpatient Medications   Medication Sig Dispense Refill    albuterol sulfate HFA (PROAIR HFA) 108 (90 Base) MCG/ACT inhaler Inhale 2 puffs into the lungs every 6 hours as needed for Wheezing or Shortness of Breath 18 g 3    albuterol (PROVENTIL) (2.5 MG/3ML) 0.083% nebulizer solution Take 3 mLs by nebulization every 6 hours as needed for Wheezing 120 each 3    OXYGEN Start oxygen therapy 3 lit with sleep for  Nocturnal hypoxia      Dx COPD 1 Units 0    carvedilol (COREG) 3.125 MG tablet Take 3.125 mg by mouth 2 times daily (with meals)      simvastatin (ZOCOR) 40 MG tablet Take 40 mg by mouth nightly      aspirin 325 MG tablet Take 325 mg by mouth daily      Multiple Vitamin (MULTI-VITAMIN DAILY) TABS Take by mouth       No current facility-administered medications for this visit. Results for orders placed during the hospital encounter of 07/22/21    XR CHEST (2 VW)    Narrative  XR CHEST (2 VW)    Clinical History:  COPD. Shortness of breath. Comparison:  6/13/2012. RESULT:    Hyperinflated lungs with flattening of the diaphragms. Coarsened lung markings. No consolidation. No pleural effusion. No pneumothorax. Stable cardiomediastinal silhouette. No acute osseous findings. Impression  No acute radiographic abnormality. Assessment/Plan:     1. Chronic obstructive pulmonary disease, unspecified COPD type ([address]  He is on 3 lit  With sleep . He is on nebulizer with albuterol TID or QID and pro air HFA prn.  C/o shortness of breath with exertion. C/o mild Wheezing. C/o Cough with white mucus  No Chest tightness. No Chest pain with radiation  or pleuritic pain. Continue bronchodilator therapy as before    2. Hypoxia  He is on 3 L O2 via nasal cannula mostly during sleep. Continue O2 to keep saturation 90% or above. 3. Shortness of breath  He is having short of breath with exertion which is chronic and unchanged. Continue bronchodilator therapy as before. Keep saturation 90% or above    Return in about 4 months (around 12/4/2022) for COPD, hypoxia on O2.       Marline Cotton MD

## 2022-09-23 ENCOUNTER — OFFICE VISIT (OUTPATIENT)
Dept: PULMONOLOGY | Age: 82
End: 2022-09-23
Payer: MEDICARE

## 2022-09-23 VITALS
SYSTOLIC BLOOD PRESSURE: 126 MMHG | DIASTOLIC BLOOD PRESSURE: 72 MMHG | HEART RATE: 69 BPM | OXYGEN SATURATION: 95 % | WEIGHT: 192 LBS | BODY MASS INDEX: 27.55 KG/M2

## 2022-09-23 DIAGNOSIS — R09.02 HYPOXIA: ICD-10-CM

## 2022-09-23 DIAGNOSIS — J44.9 CHRONIC OBSTRUCTIVE PULMONARY DISEASE, UNSPECIFIED COPD TYPE (HCC): Primary | ICD-10-CM

## 2022-09-23 PROCEDURE — 99213 OFFICE O/P EST LOW 20 MIN: CPT | Performed by: INTERNAL MEDICINE

## 2022-09-23 PROCEDURE — 1036F TOBACCO NON-USER: CPT | Performed by: INTERNAL MEDICINE

## 2022-09-23 PROCEDURE — 3023F SPIROM DOC REV: CPT | Performed by: INTERNAL MEDICINE

## 2022-09-23 PROCEDURE — G8427 DOCREV CUR MEDS BY ELIG CLIN: HCPCS | Performed by: INTERNAL MEDICINE

## 2022-09-23 PROCEDURE — G8417 CALC BMI ABV UP PARAM F/U: HCPCS | Performed by: INTERNAL MEDICINE

## 2022-09-23 PROCEDURE — 1123F ACP DISCUSS/DSCN MKR DOCD: CPT | Performed by: INTERNAL MEDICINE

## 2022-09-23 ASSESSMENT — ENCOUNTER SYMPTOMS
DIARRHEA: 0
NAUSEA: 0
COUGH: 1
WHEEZING: 1
ABDOMINAL PAIN: 0
CHEST TIGHTNESS: 0
VOICE CHANGE: 0
RHINORRHEA: 0
EYE ITCHING: 0
SHORTNESS OF BREATH: 1
SORE THROAT: 0
VOMITING: 0

## 2022-09-23 NOTE — PROGRESS NOTES
Subjective:     Bren David is a [de-identified] y.o. male who complains today of:     Chief Complaint   Patient presents with    COPD     Flare up     Shortness of Breath    Cough       HPI  He is on 3 lit  With sleep. He is on nebulizer with albuterol TID or QID and pro air HFA prn.    C/o shortness of breath with exertion. C/o Wheezing. C/o Cough with white mucus  No Hemoptysis. No Chest tightness. No Chest pain with radiation  or pleuritic pain. No  leg edema. No orthopnea. No Fever or chills. No Rhinorrhea and postnasal drip. Allergies:  Patient has no known allergies. No past medical history on file. No past surgical history on file. No family history on file. Social History     Socioeconomic History    Marital status:      Spouse name: Not on file    Number of children: Not on file    Years of education: Not on file    Highest education level: Not on file   Occupational History    Not on file   Tobacco Use    Smoking status: Former     Types: Cigarettes     Quit date: 2000     Years since quittin.7    Smokeless tobacco: Former   Substance and Sexual Activity    Alcohol use: Not on file    Drug use: Not on file    Sexual activity: Not on file   Other Topics Concern    Not on file   Social History Narrative    Not on file     Social Determinants of Health     Financial Resource Strain: Not on file   Food Insecurity: Not on file   Transportation Needs: Not on file   Physical Activity: Not on file   Stress: Not on file   Social Connections: Not on file   Intimate Partner Violence: Not on file   Housing Stability: Not on file         Review of Systems   Constitutional:  Negative for chills, diaphoresis, fatigue and fever. HENT:  Negative for congestion, mouth sores, nosebleeds, postnasal drip, rhinorrhea, sneezing, sore throat and voice change. Eyes:  Negative for itching and visual disturbance. Respiratory:  Positive for cough, shortness of breath and wheezing.  Negative for chest tightness. Cardiovascular: Negative. Negative for chest pain, palpitations and leg swelling. Gastrointestinal:  Negative for abdominal pain, diarrhea, nausea and vomiting. Genitourinary:  Negative for difficulty urinating and hematuria. Musculoskeletal:  Negative for arthralgias, joint swelling and myalgias. Skin:  Negative for rash. Allergic/Immunologic: Negative for environmental allergies. Neurological:  Negative for dizziness, tremors, weakness and headaches. Psychiatric/Behavioral:  Negative for behavioral problems and sleep disturbance.                    :     Vitals:    09/23/22 1113   BP: 126/72   Site: Right Upper Arm   Position: Sitting   Cuff Size: Large Adult   Pulse: 69   SpO2: 95%   Weight: 192 lb (87.1 kg)     Wt Readings from Last 3 Encounters:   09/23/22 192 lb (87.1 kg)   08/04/22 198 lb (89.8 kg)   09/29/21 203 lb (92.1 kg)         Physical Exam  Constitutional:       Appearance: He is well-developed. HENT:      Head: Normocephalic and atraumatic. Nose: Nose normal.   Eyes:      Conjunctiva/sclera: Conjunctivae normal.      Pupils: Pupils are equal, round, and reactive to light. Neck:      Thyroid: No thyromegaly. Vascular: No JVD. Trachea: No tracheal deviation. Cardiovascular:      Rate and Rhythm: Normal rate and regular rhythm. Heart sounds: No murmur heard. No friction rub. No gallop. Pulmonary:      Effort: Pulmonary effort is normal. No respiratory distress. Breath sounds: Wheezing present. No rales. Chest:      Chest wall: No tenderness. Abdominal:      General: There is no distension. Musculoskeletal:         General: Normal range of motion. Lymphadenopathy:      Cervical: No cervical adenopathy. Skin:     General: Skin is warm and dry. Findings: No rash. Neurological:      Mental Status: He is alert and oriented to person, place, and time. Cranial Nerves: No cranial nerve deficit.    Psychiatric: Behavior: Behavior normal.       Current Outpatient Medications   Medication Sig Dispense Refill    albuterol sulfate HFA (PROAIR HFA) 108 (90 Base) MCG/ACT inhaler Inhale 2 puffs into the lungs every 6 hours as needed for Wheezing or Shortness of Breath 18 g 3    albuterol (PROVENTIL) (2.5 MG/3ML) 0.083% nebulizer solution Take 3 mLs by nebulization every 6 hours as needed for Wheezing 120 each 3    OXYGEN Start oxygen therapy 3 lit with sleep for  Nocturnal hypoxia      Dx COPD 1 Units 0    carvedilol (COREG) 3.125 MG tablet Take 3.125 mg by mouth 2 times daily (with meals)      simvastatin (ZOCOR) 40 MG tablet Take 40 mg by mouth nightly      aspirin 325 MG tablet Take 325 mg by mouth daily      Multiple Vitamin (MULTI-VITAMIN DAILY) TABS Take by mouth       No current facility-administered medications for this visit. Results for orders placed during the hospital encounter of 07/22/21    XR CHEST (2 VW)    Narrative  XR CHEST (2 VW)    Clinical History:  COPD. Shortness of breath. Comparison:  6/13/2012. RESULT:    Hyperinflated lungs with flattening of the diaphragms. Coarsened lung markings. No consolidation. No pleural effusion. No pneumothorax. Stable cardiomediastinal silhouette. No acute osseous findings. Impression  No acute radiographic abnormality. Assessment/Plan:     1. Chronic obstructive pulmonary disease, unspecified COPD type ([address]  He is on nebulizer with albuterol TID or QID and pro air HFA prn.    C/o shortness of breath with exertion. C/o Wheezing. C/o Cough with white mucus. Continue bronchodilator therapy, will give sample of Breztri HFA 2 puff twice daily. Range mouth after use. 2. Hypoxia  He is on 3 lit  With sleep. O2 to keep saturation 90% or above    Return in about 6 weeks (around 11/7/2022) for COPD.       Jessi Davenport MD

## 2022-10-03 NOTE — TELEPHONE ENCOUNTER
SAMPLES OF BREZTRI 160 GIVEN TO PATIENT BY AKIL RAMOSY: 2  LOT# [id number]  EXP 4/2025      PLEASE SIGN OFF ON SAMPLES -- PLEASE DO NOT REFUSE SINCE THESE SAMPLES HAVE ALREADY BEEN GIVEN TO PATIENT

## 2022-10-05 RX ORDER — BUDESONIDE, GLYCOPYRROLATE, AND FORMOTEROL FUMARATE 160; 9; 4.8 UG/1; UG/1; UG/1
2 AEROSOL, METERED RESPIRATORY (INHALATION) 2 TIMES DAILY
Qty: 2 EACH | Refills: 0 | COMMUNITY
Start: 2022-10-05

## 2022-10-18 ENCOUNTER — OFFICE VISIT (OUTPATIENT)
Dept: PULMONOLOGY | Age: 82
End: 2022-10-18
Payer: MEDICARE

## 2022-10-18 VITALS
HEART RATE: 68 BPM | WEIGHT: 191 LBS | BODY MASS INDEX: 27.41 KG/M2 | SYSTOLIC BLOOD PRESSURE: 110 MMHG | DIASTOLIC BLOOD PRESSURE: 60 MMHG | OXYGEN SATURATION: 95 %

## 2022-10-18 DIAGNOSIS — R09.02 HYPOXIA: ICD-10-CM

## 2022-10-18 DIAGNOSIS — J44.1 COPD WITH EXACERBATION (HCC): Primary | ICD-10-CM

## 2022-10-18 DIAGNOSIS — Z87.891 PERSONAL HISTORY OF TOBACCO USE: ICD-10-CM

## 2022-10-18 DIAGNOSIS — E66.9 OBESITY (BMI 30-39.9): ICD-10-CM

## 2022-10-18 PROCEDURE — 99214 OFFICE O/P EST MOD 30 MIN: CPT | Performed by: INTERNAL MEDICINE

## 2022-10-18 PROCEDURE — 1123F ACP DISCUSS/DSCN MKR DOCD: CPT | Performed by: INTERNAL MEDICINE

## 2022-10-18 PROCEDURE — G8484 FLU IMMUNIZE NO ADMIN: HCPCS | Performed by: INTERNAL MEDICINE

## 2022-10-18 PROCEDURE — 1036F TOBACCO NON-USER: CPT | Performed by: INTERNAL MEDICINE

## 2022-10-18 PROCEDURE — G8417 CALC BMI ABV UP PARAM F/U: HCPCS | Performed by: INTERNAL MEDICINE

## 2022-10-18 PROCEDURE — G8427 DOCREV CUR MEDS BY ELIG CLIN: HCPCS | Performed by: INTERNAL MEDICINE

## 2022-10-18 PROCEDURE — 3023F SPIROM DOC REV: CPT | Performed by: INTERNAL MEDICINE

## 2022-10-18 RX ORDER — DOXYCYCLINE HYCLATE 100 MG
100 TABLET ORAL 2 TIMES DAILY
Qty: 20 TABLET | Refills: 0 | Status: SHIPPED | OUTPATIENT
Start: 2022-10-18 | End: 2022-10-28

## 2022-10-18 RX ORDER — PREDNISONE 10 MG/1
TABLET ORAL
Qty: 40 TABLET | Refills: 0 | Status: SHIPPED | OUTPATIENT
Start: 2022-10-18

## 2022-10-18 ASSESSMENT — ENCOUNTER SYMPTOMS
NAUSEA: 0
VOMITING: 0
CHEST TIGHTNESS: 0
SHORTNESS OF BREATH: 1
SORE THROAT: 0
ABDOMINAL PAIN: 0
EYE ITCHING: 0
WHEEZING: 1
VOICE CHANGE: 0
COUGH: 1
RHINORRHEA: 0
DIARRHEA: 0

## 2022-10-18 NOTE — PROGRESS NOTES
Negative for chest tightness. Cardiovascular: Negative. Negative for chest pain, palpitations and leg swelling. Gastrointestinal:  Negative for abdominal pain, diarrhea, nausea and vomiting. Genitourinary:  Negative for difficulty urinating and hematuria. Musculoskeletal:  Negative for arthralgias, joint swelling and myalgias. Skin:  Negative for rash. Allergic/Immunologic: Negative for environmental allergies. Neurological:  Negative for dizziness, tremors, weakness and headaches. Psychiatric/Behavioral:  Negative for behavioral problems and sleep disturbance.        :     Vitals:    10/18/22 0916   BP: 110/60   Site: Left Upper Arm   Position: Sitting   Cuff Size: Large Adult   Pulse: 68   SpO2: 95%   Weight: 191 lb (86.6 kg)     Wt Readings from Last 3 Encounters:   10/18/22 191 lb (86.6 kg)   09/23/22 192 lb (87.1 kg)   08/04/22 198 lb (89.8 kg)         Physical Exam  Constitutional:       Appearance: He is well-developed. He is obese. HENT:      Head: Normocephalic and atraumatic. Nose: Nose normal.   Eyes:      Conjunctiva/sclera: Conjunctivae normal.      Pupils: Pupils are equal, round, and reactive to light. Neck:      Thyroid: No thyromegaly. Vascular: No JVD. Trachea: No tracheal deviation. Cardiovascular:      Rate and Rhythm: Normal rate and regular rhythm. Heart sounds: No murmur heard. No friction rub. No gallop. Pulmonary:      Effort: Pulmonary effort is normal. No respiratory distress. Breath sounds: Normal breath sounds. No wheezing or rales. Comments: diminished Breath sound bilaterally. Chest:      Chest wall: No tenderness. Abdominal:      General: There is no distension. Musculoskeletal:         General: Normal range of motion. Lymphadenopathy:      Cervical: No cervical adenopathy. Skin:     General: Skin is warm and dry. Findings: No rash.    Neurological:      Mental Status: He is alert and oriented to person, place, and time. Cranial Nerves: No cranial nerve deficit. Psychiatric:         Behavior: Behavior normal.       Current Outpatient Medications   Medication Sig Dispense Refill    doxycycline hyclate (VIBRA-TABS) 100 MG tablet Take 1 tablet by mouth 2 times daily for 10 days 20 tablet 0    predniSONE (DELTASONE) 10 MG tablet 4 tablets daily for 4 days, 3 tablets daily for 4 days, 2 tablets daily for 4 days, then 1 tablet daily for 4 days 40 tablet 0    Budeson-Glycopyrrol-Formoterol (BREZTRI AEROSPHERE) 160-9-4.8 MCG/ACT AERO Inhale 2 puffs into the lungs in the morning and at bedtime 2 each 0    albuterol sulfate HFA (PROAIR HFA) 108 (90 Base) MCG/ACT inhaler Inhale 2 puffs into the lungs every 6 hours as needed for Wheezing or Shortness of Breath 18 g 3    albuterol (PROVENTIL) (2.5 MG/3ML) 0.083% nebulizer solution Take 3 mLs by nebulization every 6 hours as needed for Wheezing 120 each 3    OXYGEN Start oxygen therapy 3 lit with sleep for  Nocturnal hypoxia      Dx COPD 1 Units 0    carvedilol (COREG) 3.125 MG tablet Take 3.125 mg by mouth 2 times daily (with meals)      simvastatin (ZOCOR) 40 MG tablet Take 40 mg by mouth nightly      aspirin 325 MG tablet Take 325 mg by mouth daily      Multiple Vitamin (MULTI-VITAMIN DAILY) TABS Take by mouth       No current facility-administered medications for this visit. Results for orders placed during the hospital encounter of 07/22/21    XR CHEST (2 VW)    Narrative  XR CHEST (2 VW)    Clinical History:  COPD. Shortness of breath. Comparison:  6/13/2012. RESULT:    Hyperinflated lungs with flattening of the diaphragms. Coarsened lung markings. No consolidation. No pleural effusion. No pneumothorax. Stable cardiomediastinal silhouette. No acute osseous findings. Impression  No acute radiographic abnormality. Assessment/Plan:     1. COPD with exacerbation ([address]  He is on 3 lit  With sleep. He is on nebulizer with albuterol  QID and pro air HFA prn. C/o shortness of breath with exertion. C/o off and on Wheezing. C/o Cough with white mucus more than usual  in 2 weeks , he will have antibiotics , prednisone and CXR      - doxycycline hyclate (VIBRA-TABS) 100 MG tablet; Take 1 tablet by mouth 2 times daily for 10 days  Dispense: 20 tablet; Refill: 0  - predniSONE (DELTASONE) 10 MG tablet; 4 tablets daily for 4 days, 3 tablets daily for 4 days, 2 tablets daily for 4 days, then 1 tablet daily for 4 days  Dispense: 40 tablet; Refill: 0  - XR CHEST STANDARD (2 VW); Future    2. Hypoxia  He is on 2 lit with sleep and prn , keep Spo2 90% or above. 3. Obesity (BMI 30-39. 9)  He is advised try to lose weight. obesity related risk explained to the patient ,  Current weight:  191 lb (86.6 kg) Lbs. BMI:  Body mass index is 27.41 kg/m². Suggested weight control approaches, including dietary changes , exercise, behavioral modification. Return in about 5 weeks (around 11/22/2022) for COPD, CXR result.       Daphne Olivarez MD

## 2022-12-01 ENCOUNTER — OFFICE VISIT (OUTPATIENT)
Dept: PULMONOLOGY | Age: 82
End: 2022-12-01
Payer: MEDICARE

## 2022-12-01 VITALS
BODY MASS INDEX: 27.69 KG/M2 | SYSTOLIC BLOOD PRESSURE: 112 MMHG | WEIGHT: 193 LBS | DIASTOLIC BLOOD PRESSURE: 62 MMHG | OXYGEN SATURATION: 97 % | HEART RATE: 77 BPM

## 2022-12-01 DIAGNOSIS — J44.9 CHRONIC OBSTRUCTIVE PULMONARY DISEASE, UNSPECIFIED COPD TYPE (HCC): Primary | ICD-10-CM

## 2022-12-01 DIAGNOSIS — R09.02 HYPOXIA: ICD-10-CM

## 2022-12-01 DIAGNOSIS — R06.02 SHORTNESS OF BREATH: ICD-10-CM

## 2022-12-01 PROCEDURE — 1036F TOBACCO NON-USER: CPT | Performed by: INTERNAL MEDICINE

## 2022-12-01 PROCEDURE — G8417 CALC BMI ABV UP PARAM F/U: HCPCS | Performed by: INTERNAL MEDICINE

## 2022-12-01 PROCEDURE — G8427 DOCREV CUR MEDS BY ELIG CLIN: HCPCS | Performed by: INTERNAL MEDICINE

## 2022-12-01 PROCEDURE — G8484 FLU IMMUNIZE NO ADMIN: HCPCS | Performed by: INTERNAL MEDICINE

## 2022-12-01 PROCEDURE — 3023F SPIROM DOC REV: CPT | Performed by: INTERNAL MEDICINE

## 2022-12-01 PROCEDURE — 99214 OFFICE O/P EST MOD 30 MIN: CPT | Performed by: INTERNAL MEDICINE

## 2022-12-01 PROCEDURE — 1123F ACP DISCUSS/DSCN MKR DOCD: CPT | Performed by: INTERNAL MEDICINE

## 2022-12-01 RX ORDER — ALBUTEROL SULFATE 2.5 MG/3ML
2.5 SOLUTION RESPIRATORY (INHALATION) EVERY 6 HOURS PRN
Qty: 120 EACH | Refills: 3 | Status: SHIPPED | OUTPATIENT
Start: 2022-12-01

## 2022-12-01 ASSESSMENT — ENCOUNTER SYMPTOMS
ABDOMINAL PAIN: 0
DIARRHEA: 0
VOICE CHANGE: 0
NAUSEA: 0
WHEEZING: 0
EYE ITCHING: 0
RHINORRHEA: 0
VOMITING: 0
SHORTNESS OF BREATH: 1
COUGH: 0
SORE THROAT: 0
CHEST TIGHTNESS: 0

## 2022-12-01 NOTE — PROGRESS NOTES
Subjective:     Mumtaz Aguilera is a [de-identified] y.o. male who complains today of:     Chief Complaint   Patient presents with    Follow-up     6wk f/u     COPD       HPI  He is on 3 lit  With sleep. He is on nebulizer with albuterol  QID and pro air HFA prn. He said he is feeling much better from last time. C/o shortness of breath with exertion. No  Wheezing. No cough   No Hemoptysis. No Chest tightness. No Chest pain with radiation  or pleuritic pain. No  leg edema. No orthopnea. No Fever or chills. No Rhinorrhea and postnasal drip. Allergies:  Patient has no known allergies. No past medical history on file. No past surgical history on file. No family history on file. Social History     Socioeconomic History    Marital status:      Spouse name: Not on file    Number of children: Not on file    Years of education: Not on file    Highest education level: Not on file   Occupational History    Not on file   Tobacco Use    Smoking status: Former     Types: Cigarettes     Quit date: 2000     Years since quittin.9    Smokeless tobacco: Former   Substance and Sexual Activity    Alcohol use: Not on file    Drug use: Not on file    Sexual activity: Not on file   Other Topics Concern    Not on file   Social History Narrative    Not on file     Social Determinants of Health     Financial Resource Strain: Not on file   Food Insecurity: Not on file   Transportation Needs: Not on file   Physical Activity: Not on file   Stress: Not on file   Social Connections: Not on file   Intimate Partner Violence: Not on file   Housing Stability: Not on file         Review of Systems   Constitutional:  Negative for chills, diaphoresis, fatigue and fever. HENT:  Negative for congestion, mouth sores, nosebleeds, postnasal drip, rhinorrhea, sneezing, sore throat and voice change. Eyes:  Negative for itching and visual disturbance. Respiratory:  Positive for shortness of breath.  Negative for cough, chest tightness and wheezing. Cardiovascular: Negative. Negative for chest pain, palpitations and leg swelling. Gastrointestinal:  Negative for abdominal pain, diarrhea, nausea and vomiting. Genitourinary:  Negative for difficulty urinating and hematuria. Musculoskeletal:  Negative for arthralgias, joint swelling and myalgias. Skin:  Negative for rash. Allergic/Immunologic: Negative for environmental allergies. Neurological:  Negative for dizziness, tremors, weakness and headaches. Psychiatric/Behavioral:  Negative for behavioral problems and sleep disturbance.        :     Vitals:    12/01/22 1316   BP: 112/62   Site: Right Upper Arm   Position: Sitting   Cuff Size: Large Adult   Pulse: 77   SpO2: 97%   Weight: 193 lb (87.5 kg)     Wt Readings from Last 3 Encounters:   12/01/22 193 lb (87.5 kg)   10/18/22 191 lb (86.6 kg)   09/23/22 192 lb (87.1 kg)         Physical Exam  Constitutional:       Appearance: He is well-developed. HENT:      Head: Normocephalic and atraumatic. Nose: Nose normal.   Eyes:      Conjunctiva/sclera: Conjunctivae normal.      Pupils: Pupils are equal, round, and reactive to light. Neck:      Thyroid: No thyromegaly. Vascular: No JVD. Trachea: No tracheal deviation. Cardiovascular:      Rate and Rhythm: Normal rate and regular rhythm. Heart sounds: No murmur heard. No friction rub. No gallop. Pulmonary:      Effort: Pulmonary effort is normal. No respiratory distress. Breath sounds: Normal breath sounds. No wheezing or rales. Comments: diminished Breath sound bilaterally. Chest:      Chest wall: No tenderness. Abdominal:      General: There is no distension. Musculoskeletal:         General: Normal range of motion. Lymphadenopathy:      Cervical: No cervical adenopathy. Skin:     General: Skin is warm and dry. Findings: No rash. Neurological:      Mental Status: He is alert and oriented to person, place, and time.       Cranial Nerves: No cranial nerve deficit. Psychiatric:         Behavior: Behavior normal.       Current Outpatient Medications   Medication Sig Dispense Refill    albuterol (PROVENTIL) (2.5 MG/3ML) 0.083% nebulizer solution Take 3 mLs by nebulization every 6 hours as needed for Wheezing 120 each 3    Budeson-Glycopyrrol-Formoterol (BREZTRI AEROSPHERE) 160-9-4.8 MCG/ACT AERO Inhale 2 puffs into the lungs in the morning and at bedtime 2 each 0    albuterol sulfate HFA (PROAIR HFA) 108 (90 Base) MCG/ACT inhaler Inhale 2 puffs into the lungs every 6 hours as needed for Wheezing or Shortness of Breath 18 g 3    OXYGEN Start oxygen therapy 3 lit with sleep for  Nocturnal hypoxia      Dx COPD 1 Units 0    carvedilol (COREG) 3.125 MG tablet Take 3.125 mg by mouth 2 times daily (with meals)      simvastatin (ZOCOR) 40 MG tablet Take 40 mg by mouth nightly      aspirin 325 MG tablet Take 325 mg by mouth daily      Multiple Vitamin (MULTI-VITAMIN DAILY) TABS Take by mouth      predniSONE (DELTASONE) 10 MG tablet 4 tablets daily for 4 days, 3 tablets daily for 4 days, 2 tablets daily for 4 days, then 1 tablet daily for 4 days (Patient not taking: Reported on 12/1/2022) 40 tablet 0     No current facility-administered medications for this visit. Results for orders placed during the hospital encounter of 07/22/21    XR CHEST (2 VW)    Narrative  XR CHEST (2 VW)    Clinical History:  COPD. Shortness of breath. Comparison:  6/13/2012. RESULT:    Hyperinflated lungs with flattening of the diaphragms. Coarsened lung markings. No consolidation. No pleural effusion. No pneumothorax. Stable cardiomediastinal silhouette. No acute osseous findings. Impression  No acute radiographic abnormality. Assessment/Plan:     1. Chronic obstructive pulmonary disease, unspecified COPD type ([address]  He is on 3 lit  With sleep. He is on nebulizer with albuterol  QID and pro air HFA prn. He said he is feeling much better from last time. C/o shortness of breath with exertion. No  Wheezing. No cough continue bronchodilator therapy and O2 as before. Refill for nebulizer solution did    - albuterol (PROVENTIL) (2.5 MG/3ML) 0.083% nebulizer solution; Take 3 mLs by nebulization every 6 hours as needed for Wheezing  Dispense: 120 each; Refill: 3    2. Hypoxia  He is on 3 lit  With sleep every night. He has O2 for prn use during daytime but he is rarely using during daytime. 3. Shortness of breath  He having  Chronic shortness of breath which is likely due to COPD, continue  Bronchodilator, O2 as before. keep  Spo2 90% or above. Return in about 5 months (around 5/1/2023) for COPD, hypoxia on O2.       Eduard Crowder MD

## 2023-05-01 ENCOUNTER — TELEMEDICINE (OUTPATIENT)
Dept: PULMONOLOGY | Age: 83
End: 2023-05-01
Payer: MEDICARE

## 2023-05-01 DIAGNOSIS — R09.02 HYPOXIA: ICD-10-CM

## 2023-05-01 DIAGNOSIS — J44.9 CHRONIC OBSTRUCTIVE PULMONARY DISEASE, UNSPECIFIED COPD TYPE (HCC): Primary | ICD-10-CM

## 2023-05-01 DIAGNOSIS — R06.02 SHORTNESS OF BREATH: ICD-10-CM

## 2023-05-01 PROCEDURE — 99443 PR PHYS/QHP TELEPHONE EVALUATION 21-30 MIN: CPT | Performed by: INTERNAL MEDICINE

## 2023-05-01 ASSESSMENT — ENCOUNTER SYMPTOMS
VOICE CHANGE: 0
COUGH: 0
SORE THROAT: 0
SHORTNESS OF BREATH: 1
ABDOMINAL PAIN: 0
CHEST TIGHTNESS: 0
RHINORRHEA: 0
DIARRHEA: 0
NAUSEA: 0
VOMITING: 0
WHEEZING: 1
EYE ITCHING: 0

## 2023-05-01 NOTE — PROGRESS NOTES
Parveen Mccracken (:  1940) is a Established patient, presenting virtually for evaluation of the following:    Assessment & Plan   Below is the assessment and plan developed based on review of pertinent history, physical exam, labs, studies, and medications. 1. Chronic obstructive pulmonary disease, unspecified COPD type ([facility] [address]  2. Hypoxia  3. Shortness of breath    He is on 3 lit With sleep but he has not used for last month,  he said he is sleeping ok without it but he is back from [place] and will start using it . He is on nebulizer with albuterol  QID and pro air HFA prn.  C/o shortness of breath with exertion. Occasional  Wheezing. No cough. continue bronchodilator as before , restart using O2 as before     Return in about 3 months (around 2023) for COPD, hypoxia on O2. Subjective   HPI  He is on 3 lit With sleep but he has not used for last month,  he said he is sleeping ok without it but he is back from [place] and will start using it . He is on nebulizer with albuterol  QID and pro air HFA prn.    C/o shortness of breath with exertion. Occasional  Wheezing. No cough   No Hemoptysis. No Chest tightness. No Chest pain with radiation  or pleuritic pain. No  leg edema. No orthopnea. No Fever or chills. No Rhinorrhea and postnasal drip. Review of Systems   Constitutional:  Negative for chills, diaphoresis, fatigue and fever. HENT:  Negative for congestion, mouth sores, nosebleeds, postnasal drip, rhinorrhea, sneezing, sore throat and voice change. Eyes:  Negative for itching and visual disturbance. Respiratory:  Positive for shortness of breath and wheezing. Negative for cough and chest tightness. Cardiovascular: Negative. Negative for chest pain, palpitations and leg swelling. Gastrointestinal:  Negative for abdominal pain, diarrhea, nausea and vomiting. Genitourinary:  Negative for difficulty urinating and hematuria.    Musculoskeletal:  Negative for arthralgias, joint

## 2023-08-03 ENCOUNTER — OFFICE VISIT (OUTPATIENT)
Dept: PULMONOLOGY | Age: 83
End: 2023-08-03
Payer: MEDICARE

## 2023-08-03 VITALS
TEMPERATURE: 97.3 F | HEART RATE: 80 BPM | DIASTOLIC BLOOD PRESSURE: 68 MMHG | WEIGHT: 190 LBS | OXYGEN SATURATION: 95 % | BODY MASS INDEX: 27.26 KG/M2 | SYSTOLIC BLOOD PRESSURE: 108 MMHG

## 2023-08-03 DIAGNOSIS — J44.9 CHRONIC OBSTRUCTIVE PULMONARY DISEASE, UNSPECIFIED COPD TYPE (HCC): ICD-10-CM

## 2023-08-03 DIAGNOSIS — R09.02 HYPOXIA: Primary | ICD-10-CM

## 2023-08-03 PROCEDURE — 99214 OFFICE O/P EST MOD 30 MIN: CPT | Performed by: INTERNAL MEDICINE

## 2023-08-03 PROCEDURE — 3023F SPIROM DOC REV: CPT | Performed by: INTERNAL MEDICINE

## 2023-08-03 PROCEDURE — 1036F TOBACCO NON-USER: CPT | Performed by: INTERNAL MEDICINE

## 2023-08-03 PROCEDURE — G8427 DOCREV CUR MEDS BY ELIG CLIN: HCPCS | Performed by: INTERNAL MEDICINE

## 2023-08-03 PROCEDURE — 1123F ACP DISCUSS/DSCN MKR DOCD: CPT | Performed by: INTERNAL MEDICINE

## 2023-08-03 PROCEDURE — G8417 CALC BMI ABV UP PARAM F/U: HCPCS | Performed by: INTERNAL MEDICINE

## 2023-08-03 RX ORDER — ALBUTEROL SULFATE 90 UG/1
2 AEROSOL, METERED RESPIRATORY (INHALATION) EVERY 6 HOURS PRN
Qty: 18 G | Refills: 3 | Status: SHIPPED | OUTPATIENT
Start: 2023-08-03

## 2023-08-03 RX ORDER — BUDESONIDE, GLYCOPYRROLATE, AND FORMOTEROL FUMARATE 160; 9; 4.8 UG/1; UG/1; UG/1
2 AEROSOL, METERED RESPIRATORY (INHALATION) 2 TIMES DAILY
Qty: 4 EACH | Refills: 0 | COMMUNITY
Start: 2023-08-03

## 2023-08-03 ASSESSMENT — ENCOUNTER SYMPTOMS
VOICE CHANGE: 0
DIARRHEA: 0
SORE THROAT: 0
WHEEZING: 1
NAUSEA: 0
COUGH: 1
SHORTNESS OF BREATH: 1
RHINORRHEA: 0
EYE ITCHING: 0
ABDOMINAL PAIN: 0
VOMITING: 0
CHEST TIGHTNESS: 0

## 2023-08-03 NOTE — PROGRESS NOTES
Subjective:     Ernesto Heard is a 82 y.o. male who complains today of:     Chief Complaint   Patient presents with    Follow-up     3m f/u on COPD, hypoxia, SOB       HPI  He is on 3 lit With sleep . He is on nebulizer with albuterol QID and pro air HFA prn.    C/o shortness of breath with exertion. Occasional  Wheezing. No cough . No Hemoptysis. No Chest tightness. No Chest pain with radiation  or pleuritic pain. No  leg edema. No orthopnea. No Fever or chills. No Rhinorrhea and postnasal drip. Allergies:  Patient has no known allergies. No past medical history on file. No past surgical history on file. No family history on file. Social History     Socioeconomic History    Marital status:      Spouse name: Not on file    Number of children: Not on file    Years of education: Not on file    Highest education level: Not on file   Occupational History    Not on file   Tobacco Use    Smoking status: Former     Types: Cigarettes     Quit date: 2000     Years since quittin.6    Smokeless tobacco: Former   Substance and Sexual Activity    Alcohol use: Not on file    Drug use: Not on file    Sexual activity: Not on file   Other Topics Concern    Not on file   Social History Narrative    Not on file     Social Determinants of Health     Financial Resource Strain: Not on file   Food Insecurity: Not on file   Transportation Needs: Not on file   Physical Activity: Not on file   Stress: Not on file   Social Connections: Not on file   Intimate Partner Violence: Not on file   Housing Stability: Not on file         Review of Systems   Constitutional:  Negative for chills, diaphoresis, fatigue and fever. HENT:  Negative for congestion, mouth sores, nosebleeds, postnasal drip, rhinorrhea, sneezing, sore throat and voice change. Eyes:  Negative for itching and visual disturbance. Respiratory:  Positive for cough, shortness of breath and wheezing. Negative for chest tightness.     Cardiovascular:

## 2023-11-06 PROBLEM — J20.9 ACUTE BRONCHITIS: Status: ACTIVE | Noted: 2023-11-06

## 2023-11-06 PROBLEM — I25.10 ATHEROSCLEROSIS OF NATIVE CORONARY ARTERY WITHOUT ANGINA PECTORIS: Status: ACTIVE | Noted: 2023-11-06

## 2023-11-06 PROBLEM — S06.5XAA SUBDURAL HEMATOMA (MULTI): Status: ACTIVE | Noted: 2023-11-06

## 2023-11-06 PROBLEM — R06.02 SOB (SHORTNESS OF BREATH) ON EXERTION: Status: ACTIVE | Noted: 2023-11-06

## 2023-11-06 PROBLEM — I42.8 NONISCHEMIC CARDIOMYOPATHY (MULTI): Status: ACTIVE | Noted: 2023-11-06

## 2023-11-06 PROBLEM — I67.1 INTRACRANIAL ANEURYSM (HHS-HCC): Status: ACTIVE | Noted: 2023-11-06

## 2023-11-06 PROBLEM — R42 VERTIGO: Status: ACTIVE | Noted: 2023-11-06

## 2023-11-06 PROBLEM — R53.83 FATIGUE: Status: ACTIVE | Noted: 2023-11-06

## 2023-11-06 PROBLEM — I10 HYPERTENSION, ESSENTIAL, BENIGN: Status: ACTIVE | Noted: 2023-11-06

## 2023-11-06 PROBLEM — K21.9 ESOPHAGEAL REFLUX: Status: ACTIVE | Noted: 2023-11-06

## 2023-11-06 PROBLEM — E78.5 HYPERLIPIDEMIA: Status: ACTIVE | Noted: 2023-11-06

## 2023-11-06 PROBLEM — R73.9 ELEVATED BLOOD SUGAR: Status: ACTIVE | Noted: 2023-11-06

## 2023-11-06 PROBLEM — J44.9 CHRONIC OBSTRUCTIVE PULMONARY DISEASE (MULTI): Status: ACTIVE | Noted: 2023-11-06

## 2023-11-06 PROBLEM — R42 DIZZINESSES: Status: ACTIVE | Noted: 2023-11-06

## 2023-11-06 RX ORDER — MULTIVIT-MIN/FA/LYCOPEN/LUTEIN .4-300-25
1 TABLET ORAL DAILY PRN
COMMUNITY

## 2023-11-06 RX ORDER — SIMVASTATIN 40 MG/1
0.5 TABLET, FILM COATED ORAL DAILY
COMMUNITY
End: 2024-02-12

## 2023-11-06 RX ORDER — ALBUTEROL SULFATE 90 UG/1
2 AEROSOL, METERED RESPIRATORY (INHALATION) EVERY 4 HOURS PRN
COMMUNITY

## 2023-11-06 RX ORDER — CARVEDILOL 3.12 MG/1
1 TABLET ORAL
COMMUNITY
End: 2023-11-27 | Stop reason: SDUPTHER

## 2023-11-07 ENCOUNTER — OFFICE VISIT (OUTPATIENT)
Dept: PULMONOLOGY | Age: 83
End: 2023-11-07
Payer: MEDICARE

## 2023-11-07 VITALS
WEIGHT: 189 LBS | OXYGEN SATURATION: 96 % | BODY MASS INDEX: 27.12 KG/M2 | SYSTOLIC BLOOD PRESSURE: 112 MMHG | DIASTOLIC BLOOD PRESSURE: 64 MMHG | HEART RATE: 63 BPM | TEMPERATURE: 97.3 F

## 2023-11-07 DIAGNOSIS — R09.02 HYPOXIA: ICD-10-CM

## 2023-11-07 DIAGNOSIS — R06.02 SHORTNESS OF BREATH: ICD-10-CM

## 2023-11-07 DIAGNOSIS — J44.9 CHRONIC OBSTRUCTIVE PULMONARY DISEASE, UNSPECIFIED COPD TYPE (HCC): Primary | ICD-10-CM

## 2023-11-07 PROCEDURE — 1123F ACP DISCUSS/DSCN MKR DOCD: CPT | Performed by: INTERNAL MEDICINE

## 2023-11-07 PROCEDURE — 99213 OFFICE O/P EST LOW 20 MIN: CPT | Performed by: INTERNAL MEDICINE

## 2023-11-07 PROCEDURE — G8417 CALC BMI ABV UP PARAM F/U: HCPCS | Performed by: INTERNAL MEDICINE

## 2023-11-07 PROCEDURE — 1036F TOBACCO NON-USER: CPT | Performed by: INTERNAL MEDICINE

## 2023-11-07 PROCEDURE — G8484 FLU IMMUNIZE NO ADMIN: HCPCS | Performed by: INTERNAL MEDICINE

## 2023-11-07 PROCEDURE — G8427 DOCREV CUR MEDS BY ELIG CLIN: HCPCS | Performed by: INTERNAL MEDICINE

## 2023-11-07 PROCEDURE — 3023F SPIROM DOC REV: CPT | Performed by: INTERNAL MEDICINE

## 2023-11-07 ASSESSMENT — ENCOUNTER SYMPTOMS
NAUSEA: 0
EYE ITCHING: 0
VOMITING: 0
CHEST TIGHTNESS: 0
WHEEZING: 0
COUGH: 0
ABDOMINAL PAIN: 0
DIARRHEA: 0
SORE THROAT: 0
SHORTNESS OF BREATH: 1
VOICE CHANGE: 0
RHINORRHEA: 0

## 2023-11-07 NOTE — PROGRESS NOTES
Psychiatric:         Behavior: Behavior normal.         Current Outpatient Medications   Medication Sig Dispense Refill    albuterol sulfate HFA (PROAIR HFA) 108 (90 Base) MCG/ACT inhaler Inhale 2 puffs into the lungs every 6 hours as needed for Wheezing or Shortness of Breath 18 g 3    Budeson-Glycopyrrol-Formoterol (BREZTRI AEROSPHERE) 160-9-4.8 MCG/ACT AERO Inhale 2 puffs into the lungs in the morning and at bedtime 4 each 0    albuterol (PROVENTIL) (2.5 MG/3ML) 0.083% nebulizer solution Take 3 mLs by nebulization every 6 hours as needed for Wheezing 120 each 3    OXYGEN Start oxygen therapy 3 lit with sleep for  Nocturnal hypoxia      Dx COPD 1 Units 0    carvedilol (COREG) 3.125 MG tablet Take 1 tablet by mouth 2 times daily (with meals)      simvastatin (ZOCOR) 40 MG tablet Take 1 tablet by mouth nightly      aspirin 325 MG tablet Take 1 tablet by mouth daily      Multiple Vitamin (MULTI-VITAMIN DAILY) TABS Take by mouth       No current facility-administered medications for this visit. Results for orders placed during the hospital encounter of 07/22/21    XR CHEST (2 VW)    Narrative  XR CHEST (2 VW)    Clinical History:  COPD. Shortness of breath. Comparison:  6/13/2012. RESULT:    Hyperinflated lungs with flattening of the diaphragms. Coarsened lung markings. No consolidation. No pleural effusion. No pneumothorax. Stable cardiomediastinal silhouette. No acute osseous findings. Impression  No acute radiographic abnormality. Assessment/Plan:     1. Chronic obstructive pulmonary disease, unspecified COPD type ([address])  He is on 3 lit With sleep . He need new [place]. He is on nebulizer with albuterol QID and pro air HFA prn.  C/o shortness of breath with exertion. Occasional  Wheezing. No cough . No Chest pain with radiation  or pleuritic pain. 2. Hypoxia  He is on 3 lit With sleep . Continue O2 to keep Spo2 90% or above      3.  Shortness of breath  He having  Chronic shortness of breath

## 2023-11-27 ENCOUNTER — OFFICE VISIT (OUTPATIENT)
Dept: CARDIOLOGY | Facility: CLINIC | Age: 83
End: 2023-11-27
Payer: MEDICARE

## 2023-11-27 VITALS
WEIGHT: 191 LBS | HEART RATE: 82 BPM | BODY MASS INDEX: 28.29 KG/M2 | HEIGHT: 69 IN | SYSTOLIC BLOOD PRESSURE: 108 MMHG | DIASTOLIC BLOOD PRESSURE: 52 MMHG

## 2023-11-27 DIAGNOSIS — I42.8 NONISCHEMIC CARDIOMYOPATHY (MULTI): ICD-10-CM

## 2023-11-27 DIAGNOSIS — J44.9 CHRONIC OBSTRUCTIVE PULMONARY DISEASE, UNSPECIFIED COPD TYPE (MULTI): ICD-10-CM

## 2023-11-27 DIAGNOSIS — I25.10 ATHEROSCLEROSIS OF NATIVE CORONARY ARTERY OF NATIVE HEART WITHOUT ANGINA PECTORIS: Primary | ICD-10-CM

## 2023-11-27 DIAGNOSIS — I49.3 PVC (PREMATURE VENTRICULAR CONTRACTION): ICD-10-CM

## 2023-11-27 DIAGNOSIS — I10 HYPERTENSION, ESSENTIAL, BENIGN: ICD-10-CM

## 2023-11-27 DIAGNOSIS — E78.2 MIXED HYPERLIPIDEMIA: ICD-10-CM

## 2023-11-27 PROCEDURE — 93000 ELECTROCARDIOGRAM COMPLETE: CPT | Performed by: INTERNAL MEDICINE

## 2023-11-27 PROCEDURE — 1036F TOBACCO NON-USER: CPT | Performed by: INTERNAL MEDICINE

## 2023-11-27 PROCEDURE — 1159F MED LIST DOCD IN RCRD: CPT | Performed by: INTERNAL MEDICINE

## 2023-11-27 PROCEDURE — 3078F DIAST BP <80 MM HG: CPT | Performed by: INTERNAL MEDICINE

## 2023-11-27 PROCEDURE — 3074F SYST BP LT 130 MM HG: CPT | Performed by: INTERNAL MEDICINE

## 2023-11-27 PROCEDURE — 99214 OFFICE O/P EST MOD 30 MIN: CPT | Performed by: INTERNAL MEDICINE

## 2023-11-27 RX ORDER — CARVEDILOL 3.12 MG/1
3.12 TABLET ORAL
Qty: 90 TABLET | Refills: 3 | Status: CANCELLED | OUTPATIENT
Start: 2023-11-27

## 2023-11-27 RX ORDER — METOPROLOL SUCCINATE 25 MG/1
TABLET, EXTENDED RELEASE ORAL
Qty: 90 TABLET | Refills: 3 | Status: SHIPPED
Start: 2023-11-27 | End: 2024-01-05 | Stop reason: DRUGHIGH

## 2023-11-27 NOTE — PROGRESS NOTES
Patient:  Oscar Chavez  YOB: 1940  MRN: [id number]       HPI:       Oscar Chavez is a 83 y.o. male who returns today for cardiac follow-up.  He is accompanied by his wife. He has a history of nonischemic dilated cardiomyopathy. His ejection fractions were as low as 30% with most recent echocardiogram June 22, 2018 showing an LVEF of 55-60%. Remote cardiac catheterization in 2001 revealed 40-60% mid LAD with 10-20% RCA and luminal irregularities of circumflex EF was 30-35% at that time. A CT angiogram in 2009 showed 50% LAD stenosis with otherwise minimal disease. He has essential hypertension and hyperlipidemia. In December 2016 he underwent resection of an intercerebral aneurysm after presenting with an episode of severe vertigo per Dr. Rodger Sarmiento at [facility]. His postop course was complicated by a subdural hematoma for which he underwent redo craniotomy and drainage. He had initial expressive aphasia which normalized.      He underwent a Lexiscan myocardial perfusion study on June 8, 2021 for evaluation of shortness of breath. The study was negative for ischemia. There was some diaphragmatic attenuation artifact. Calculated LV ejection fraction was 56%. He underwent PFTs and was noted to have mild obstructive disease.  He was seen by Dr. Johns. He was placed on nocturnal oxygen and given albuterol. He states his symptoms have improved.      He has been doing well since his last visit. He remains reasonably active without limitations.  He and his wife typically spend the winter in [place] but state they might forego that this year.  He admits that he has not been compliant with taking his medications.  He denies any chest discomfort. He denies any orthopnea, PND, or increasing peripheral edema. He denies any palpitations, lightheadedness, near-syncope, or syncope. He denies any fever, chills, or cough. He denies any nausea, vomiting, or diaphoresis. He denies any hemoptysis,  hematemesis, melena, or hematochezia. His blood pressures are well controlled. Lab studies dated December 2021 showed a normal CBC and CMP. LDL cholesterol 193.  He was noted to have some irregularities of his heart rhythm and EKG in the office shows normal sinus rhythm and PVCs versus PACs with aberrant conduction.  He was advised to start back on a beta-blocker.  He will be started on Toprol-XL 25 mg daily.  He was also advised to start back on simvastatin.  He will be scheduled for a follow-up echocardiogram to reassess underlying cardiomyopathy.  He will also be scheduled for a Holter monitor.  Other details as noted below.     The above portion of this note was dictated by me using voice recognition software. I personally performed the services described in the documentation. The scribe entering the documentation below was in my presence. I affirm that the information is both accurate and complete.       Objective:     Vitals:    11/27/23 1433   BP: 108/52   Pulse: 82       Wt Readings from Last 4 Encounters:   11/27/23 86.6 kg (191 lb)   06/17/22 89.4 kg (197 lb)   12/14/21 93 kg (205 lb)       Allergies:     No Known Allergies     Medications:     Current Outpatient Medications   Medication Instructions    albuterol 90 mcg/actuation inhaler inhalation, Every 4 hours PRN    aspirin 81 mg capsule 1 tablet, oral, Daily    carvedilol (Coreg) 3.125 mg tablet 1 tablet, oral, 2 times daily with meals    multivitamin with minerals iron-free (Centrum Silver) 1 tablet, oral, Daily    simvastatin (Zocor) 40 mg tablet 0.5 tablets, oral, Daily       Physical Examination:   GENERAL:  Well developed, well nourished, in no acute distress.  CHEST:  Symmetric and nontender.  NEURO/PSYCH:  Alert and oriented times three with approppriate behavior and responses.  NECK:  Supple, no JVD, no bruit.  LUNGS:  Clear to auscultation bilaterally, normal respiratory effort.  HEART:  Rate and rhythm regular with occasional ectopy, I/VI  KYMBERLY LSB, no gallop appreciated.        There are no rubs, clicks or heaves.  EXTREMITIES:  Warm with good color, no clubbing or cyanosis.  There is no edema noted.  PERIPHERAL VASCULAR:  Pulses present and equally palpable; 2+ throughout.      Lab:     CBC:   Lab Results   Component Value Date    WBC 9.2 12/14/2021    RBC 4.59 12/14/2021    HGB 14.3 12/14/2021    HCT 43.2 12/14/2021     12/14/2021        CMP:    Lab Results   Component Value Date     12/14/2021    K 4.0 12/14/2021     12/14/2021    CO2 28 12/14/2021    BUN 17 12/14/2021    CREATININE 1.21 12/14/2021    GLUCOSE 105 (H) 12/14/2021    CALCIUM 9.7 12/14/2021       Lipid Profile:    Lab Results   Component Value Date    TRIG 280 (H) 12/14/2021    HDL 53.0 12/14/2021       BMP:  Lab Results   Component Value Date     12/14/2021     06/09/2020     11/05/2019    K 4.0 12/14/2021    K 4.2 06/09/2020    K 4.0 11/05/2019     12/14/2021     06/09/2020     11/05/2019    CO2 28 12/14/2021    CO2 28 06/09/2020    CO2 28 11/05/2019    BUN 17 12/14/2021    BUN 20 06/09/2020    BUN 18 11/05/2019    CREATININE 1.21 12/14/2021    CREATININE 1.12 06/09/2020    CREATININE 1.14 11/05/2019       CBC:  Lab Results   Component Value Date    WBC 9.2 12/14/2021    RBC 4.59 12/14/2021    HGB 14.3 12/14/2021    HCT 43.2 12/14/2021    MCV 94 12/14/2021    MCHC 33.1 12/14/2021    RDW 14.2 12/14/2021     12/14/2021       Hepatic Function Panel:    Lab Results   Component Value Date    ALKPHOS 54 12/14/2021    ALT 24 12/14/2021    AST 22 12/14/2021    PROT 7.1 12/14/2021    BILITOT 0.6 12/14/2021       Diagnostic Studies:     Electrocardiogram 12 Lead    Result Date: 2/14/2023  Normal sinus rhythm Low voltage QRS Borderline ECG When compared with ECG of 18-MAY-2016 14:41, No significant change was found Confirmed by SANTOS CAMACHO MD ([id number]) on 12/11/2016 3:58:35 PM    === 06/08/21 ===    - Impression -  Borderline  abnormal Lexiscan Myoview cardiac perfusion stress test.  No evidence of myocardial ischemia by perfusion imaging.  Small fixed defect posterolateral wall which may well be diaphragm  attenuation in that there is some wall thickening in this region.  Normal left ventricular systolic function.  Left ventricular ejection fraction 56 %.    Overall low risk pattern stress test. Although initial exercise did  not achieve 85% target heart rate he did achieve 7 M ET of exercise  which is excellent for his age and gender. This was not associated  with EKG changes or angina. Lexiscan showed no inducible ischemia  with preserved LV function. There is a question of posterolateral  infarct versus soft tissue attenuation but this involves throughout  small portion of the myocardium..      Problem List:     Patient Active Problem List   Diagnosis    Acute bronchitis    Atherosclerosis of native coronary artery without angina pectoris    Chronic obstructive pulmonary disease (CMS/HCC)    Elevated blood sugar    Esophageal reflux    Fatigue    Hyperlipidemia    Hypertension, essential, benign    Intracranial aneurysm    Nonischemic cardiomyopathy (CMS/HCC)    SOB (shortness of breath) on exertion    Subdural hematoma (CMS/HCC)    Dizzinesses    Vertigo       Asessment:     Problem List Items Addressed This Visit             ICD-10-CM    Atherosclerosis of native coronary artery without angina pectoris - Primary I25.10    Relevant Medications    metoprolol succinate XL (Toprol-XL) 25 mg 24 hr tablet    Other Relevant Orders    Transthoracic Echo (TTE) Complete    Holter or Event Cardiac Monitor    Follow Up In Cardiology    Chronic obstructive pulmonary disease (CMS/HCC) J44.9    Relevant Medications    metoprolol succinate XL (Toprol-XL) 25 mg 24 hr tablet    Other Relevant Orders    Transthoracic Echo (TTE) Complete    Holter or Event Cardiac Monitor    Follow Up In Cardiology    Hyperlipidemia E78.5    Relevant Medications     metoprolol succinate XL (Toprol-XL) 25 mg 24 hr tablet    Other Relevant Orders    Transthoracic Echo (TTE) Complete    Holter or Event Cardiac Monitor    Follow Up In Cardiology    Hypertension, essential, benign I10    Relevant Medications    metoprolol succinate XL (Toprol-XL) 25 mg 24 hr tablet    Other Relevant Orders    Transthoracic Echo (TTE) Complete    Holter or Event Cardiac Monitor    Follow Up In Cardiology    Nonischemic cardiomyopathy (CMS/HCC) I42.8    Relevant Medications    metoprolol succinate XL (Toprol-XL) 25 mg 24 hr tablet    Other Relevant Orders    Transthoracic Echo (TTE) Complete    Holter or Event Cardiac Monitor    Follow Up In Cardiology     Other Visit Diagnoses         Codes    PVC (premature ventricular contraction)     I49.3    Relevant Medications    metoprolol succinate XL (Toprol-XL) 25 mg 24 hr tablet    Other Relevant Orders    Transthoracic Echo (TTE) Complete    Holter or Event Cardiac Monitor    Follow Up In Cardiology            Scribe Attestation  By signing my name below, ICelestina CMA   , Scribe   attest that this documentation has been prepared under the direction and in the presence of Tlyer Drake MD.

## 2023-12-06 ENCOUNTER — TELEPHONE (OUTPATIENT)
Dept: CARDIOLOGY | Facility: CLINIC | Age: 83
End: 2023-12-06
Payer: MEDICARE

## 2023-12-06 NOTE — TELEPHONE ENCOUNTER
Patient's wife called office to clarify if patient was to stop Carvedilol.  Wife states patient was given new medication Metoprolol Succinate at appointment last week. Patient was on the line with his wife.  Both informed per instructions at that appointment Carvedilol was not to be taken.  Wife verbalized understanding stating patient has been taking both Carvedilol and Metoprolol.  She states she will stop giving patient Carvedilol.   BP today was 122/59 with pulse 52.  Note sent to Dr Drake for FYI.  Celestina Smith, CMA

## 2023-12-18 ENCOUNTER — ANCILLARY PROCEDURE (OUTPATIENT)
Dept: CARDIOLOGY | Facility: CLINIC | Age: 83
End: 2023-12-18
Payer: MEDICARE

## 2023-12-18 DIAGNOSIS — I49.3 PVC (PREMATURE VENTRICULAR CONTRACTION): ICD-10-CM

## 2023-12-18 DIAGNOSIS — I42.8 NONISCHEMIC CARDIOMYOPATHY (MULTI): ICD-10-CM

## 2023-12-18 DIAGNOSIS — J44.9 CHRONIC OBSTRUCTIVE PULMONARY DISEASE, UNSPECIFIED COPD TYPE (MULTI): ICD-10-CM

## 2023-12-18 DIAGNOSIS — E78.2 MIXED HYPERLIPIDEMIA: ICD-10-CM

## 2023-12-18 DIAGNOSIS — I25.10 ATHEROSCLEROSIS OF NATIVE CORONARY ARTERY OF NATIVE HEART WITHOUT ANGINA PECTORIS: ICD-10-CM

## 2023-12-18 DIAGNOSIS — I10 HYPERTENSION, ESSENTIAL, BENIGN: ICD-10-CM

## 2023-12-18 LAB
AORTIC VALVE MEAN GRADIENT: 2
AORTIC VALVE PEAK VELOCITY: 1.07
AV PEAK GRADIENT: 4.6
AVA (PEAK VEL): 3.42
AVA (VTI): 3.34
EJECTION FRACTION APICAL 4 CHAMBER: 51.4
LEFT VENTRICLE INTERNAL DIMENSION DIASTOLE: 4.69 (ref 3.5–6)
LEFT VENTRICULAR OUTFLOW TRACT DIAMETER: 2.3
MITRAL VALVE E/A RATIO: 0.68
RIGHT VENTRICLE PEAK SYSTOLIC PRESSURE: 21

## 2023-12-18 PROCEDURE — 93225 XTRNL ECG REC<48 HRS REC: CPT | Performed by: INTERNAL MEDICINE

## 2023-12-18 PROCEDURE — 93306 TTE W/DOPPLER COMPLETE: CPT | Performed by: INTERNAL MEDICINE

## 2023-12-18 PROCEDURE — 93227 XTRNL ECG REC<48 HR R&I: CPT | Performed by: INTERNAL MEDICINE

## 2023-12-18 PROCEDURE — 93306 TTE W/DOPPLER COMPLETE: CPT

## 2023-12-21 ENCOUNTER — TELEPHONE (OUTPATIENT)
Dept: CARDIOLOGY | Facility: CLINIC | Age: 83
End: 2023-12-21
Payer: MEDICARE

## 2023-12-21 DIAGNOSIS — I48.19 PERSISTENT ATRIAL FIBRILLATION (MULTI): Primary | ICD-10-CM

## 2023-12-21 NOTE — TELEPHONE ENCOUNTER
Per email [facility] holter scan patient has new onset atrial fibrillation.  Tracings have been scanned and assigned to Dr. Holden as in office physician.  Patient has a pending appointment with Dr. Drake 1/5/2024.  Note sent to Dr. Holden as the in office physician.  Celestina Smith, CMA

## 2023-12-22 NOTE — TELEPHONE ENCOUNTER
----- Message from Rachel Holden MD sent at 12/21/2023  7:46 PM EST -----  I reviewed this patient's Holter on 12/21/2023, I was notified that they were abnormal findings.  Patient does have short bursts of atrial fibrillation.  I reviewed his CBC and his clinical history.  I tried to call him, multiple number and wife's number went to [employer].  Please call this patient on 12/22/2022.  Please ask him to start Eliquis 5 mg p.o. twice daily.  We can start him off with samples if he is willing to  from the office.  Give him enough to last till appointment with Dr. Drake first week of January.    I will be happy to talk to him to explain the rationale, please let me know, I should be available after 12 noon.  You can text if patient is available to talk.  Thank you

## 2023-12-26 NOTE — TELEPHONE ENCOUNTER
12/26/23  Returned voice mail call to patient's wife.  She was not available.  Left message instructing her to call the office.  Celestina Smith, CMA

## 2024-01-05 ENCOUNTER — OFFICE VISIT (OUTPATIENT)
Dept: CARDIOLOGY | Facility: CLINIC | Age: 84
End: 2024-01-05
Payer: MEDICARE

## 2024-01-05 VITALS
HEIGHT: 69 IN | SYSTOLIC BLOOD PRESSURE: 112 MMHG | WEIGHT: 190 LBS | BODY MASS INDEX: 28.14 KG/M2 | DIASTOLIC BLOOD PRESSURE: 62 MMHG

## 2024-01-05 DIAGNOSIS — I49.9 VENTRICULAR ARRHYTHMIA: ICD-10-CM

## 2024-01-05 DIAGNOSIS — J44.9 CHRONIC OBSTRUCTIVE PULMONARY DISEASE, UNSPECIFIED COPD TYPE (MULTI): ICD-10-CM

## 2024-01-05 DIAGNOSIS — I10 HYPERTENSION, ESSENTIAL, BENIGN: ICD-10-CM

## 2024-01-05 DIAGNOSIS — I42.8 NONISCHEMIC CARDIOMYOPATHY (MULTI): ICD-10-CM

## 2024-01-05 DIAGNOSIS — I49.3 PVC (PREMATURE VENTRICULAR CONTRACTION): ICD-10-CM

## 2024-01-05 DIAGNOSIS — E78.2 MIXED HYPERLIPIDEMIA: ICD-10-CM

## 2024-01-05 DIAGNOSIS — I47.29 NSVT (NONSUSTAINED VENTRICULAR TACHYCARDIA) (MULTI): ICD-10-CM

## 2024-01-05 DIAGNOSIS — I49.3 FREQUENT PVCS: ICD-10-CM

## 2024-01-05 DIAGNOSIS — I48.0 PAF (PAROXYSMAL ATRIAL FIBRILLATION) (MULTI): Primary | ICD-10-CM

## 2024-01-05 DIAGNOSIS — I25.10 ATHEROSCLEROSIS OF NATIVE CORONARY ARTERY OF NATIVE HEART WITHOUT ANGINA PECTORIS: ICD-10-CM

## 2024-01-05 PROCEDURE — 3074F SYST BP LT 130 MM HG: CPT | Performed by: INTERNAL MEDICINE

## 2024-01-05 PROCEDURE — 3078F DIAST BP <80 MM HG: CPT | Performed by: INTERNAL MEDICINE

## 2024-01-05 PROCEDURE — 1036F TOBACCO NON-USER: CPT | Performed by: INTERNAL MEDICINE

## 2024-01-05 PROCEDURE — 99215 OFFICE O/P EST HI 40 MIN: CPT | Performed by: INTERNAL MEDICINE

## 2024-01-05 PROCEDURE — 1159F MED LIST DOCD IN RCRD: CPT | Performed by: INTERNAL MEDICINE

## 2024-01-05 RX ORDER — METOPROLOL SUCCINATE 50 MG/1
50 TABLET, EXTENDED RELEASE ORAL DAILY
Qty: 100 TABLET | Refills: 2 | Status: SHIPPED
Start: 2024-01-05 | End: 2024-02-12 | Stop reason: SDUPTHER

## 2024-01-05 ASSESSMENT — ENCOUNTER SYMPTOMS
LOSS OF SENSATION IN FEET: 0
DEPRESSION: 0
OCCASIONAL FEELINGS OF UNSTEADINESS: 0

## 2024-01-05 NOTE — PROGRESS NOTES
Patient:  Oscar Chavez  YOB: 1940  MRN: [id number]       HPI:       Oscar Chavez is a 83 y.o. male who returns today for cardiac follow-up. He is accompanied by his wife. He has a history of nonischemic dilated cardiomyopathy. His ejection fractions were as low as 30% with most recent echocardiogram June 22, 2018 showing an LVEF of 55-60%. Remote cardiac catheterization in 2001 revealed 40-60% mid LAD with 10-20% RCA and luminal irregularities of circumflex EF was 30-35% at that time. A CT angiogram in 2009 showed 50% LAD stenosis with otherwise minimal disease. He has essential hypertension and hyperlipidemia. In December 2016 he underwent resection of an intercerebral aneurysm after presenting with an episode of severe vertigo per Dr. Rodger Sarmiento at [facility]. His postop course was complicated by a subdural hematoma for which he underwent redo craniotomy and drainage. He had initial expressive aphasia which normalized.      He underwent a Lexiscan myocardial perfusion study on June 8, 2021 for evaluation of shortness of breath. The study was negative for ischemia. There was some diaphragmatic attenuation artifact. Calculated LV ejection fraction was 56%. He underwent PFTs and was noted to have mild obstructive disease.  He was seen by Dr. Johns. He was placed on nocturnal oxygen and given albuterol. He states his symptoms have improved.     At the time of his last visit he was noted to have some irregularities of his heart rhythm.  EKG in the office showed normal sinus rhythm with PVCs versus PACs with aberrant conduction.  He was started back on for Toprol-XL 25 mg daily.  He was also advised to start back on simvastatin.  An echocardiogram on December 18, 2023 showed estimated LV ejection fraction of 55 to 60%.  There was mild (1+) mitral and tricuspid regurgitation with estimated RVSP 21 mmHg.  The ascending aorta measured 3.8 cm.  A 24-hour Holter monitor on December 18, 2023  showed normal sinus rhythm with PVCs comprising approximately 10% of the overall rhythm.  There were periods of ventricular bigeminy.  There was one 9 beat run of atrial tachycardia at 154 bpm.  He was reported to be in atrial fibrillation approximately 4% of the time.  Review of the rhythm strips shows that the majority of time in atrial fibrillation was reported there were P waves consistent with sinus rhythm or atrial tachycardia.  He did have a few relatively brief episodes of atrial fibrillation however.     He has been doing well since his last visit. He remains reasonably active without limitations.  He and his wife typically spend the winter in [place] but state they might forego that this year.  He admits that he has not been compliant with taking his medications.  He denies any chest discomfort. He denies any orthopnea, PND, or increasing peripheral edema. He denies any palpitations, lightheadedness, near-syncope, or syncope. He denies any fever, chills, or cough. He denies any nausea, vomiting, or diaphoresis. He denies any hemoptysis, hematemesis, melena, or hematochezia. His blood pressures are well controlled.  In light of very frequent PVCs I advised that he undergo a cardiac MRI in the near future.  I advised him to increase Toprol-XL to 50 mg daily.  In light of documented atrial fibrillation on recent Holter monitor he was also advised to start on Eliquis 5 mg twice daily.  He will be referred to EP for further evaluation and recommendations.  Other details as noted below.     The above portion of this note was dictated by me using voice recognition software. I personally performed the services described in the documentation. The scribe entering the documentation below was in my presence. I affirm that the information is both accurate and complete.      Objective:     Vitals:    01/05/24 1409   BP: 112/62       Wt Readings from Last 4 Encounters:   01/05/24 86.2 kg (190 lb)   11/27/23 86.6 kg (191  lb)   06/17/22 89.4 kg (197 lb)   12/14/21 93 kg (205 lb)       Allergies:     No Known Allergies       Medications:     Current Outpatient Medications   Medication Instructions    albuterol 90 mcg/actuation inhaler 2 puffs, inhalation, Every 4 hours PRN    apixaban (ELIQUIS) 5 mg, oral, 2 times daily    aspirin 81 mg capsule 1 tablet, oral, Daily    metoprolol succinate XL (Toprol-XL) 25 mg 24 hr tablet 1 tablet daily    multivitamin with minerals iron-free (Centrum Silver) 1 tablet, oral, Daily PRN    simvastatin (Zocor) 40 mg tablet 0.5 tablets, oral, Daily       Physical Examination:   GENERAL:  Well developed, well nourished, in no acute distress.  CHEST:  Symmetric and nontender.  NEURO/PSYCH:  Alert and oriented times three with approppriate behavior and responses.  NECK:  Supple, no JVD, no bruit.  LUNGS:  Clear to auscultation bilaterally, normal respiratory effort.  HEART:  Rate and rhythm regular with frequent ectopy with I/VI KYMBERLY LSB, no gallop appreciated.        There are no rubs, clicks or heaves.  EXTREMITIES:  Warm with good color, no clubbing or cyanosis.  There is no edema noted.  PERIPHERAL VASCULAR:  Pulses present and equally palpable; 2+ throughout.      Lab:     CBC:   Lab Results   Component Value Date    WBC 9.2 12/14/2021    RBC 4.59 12/14/2021    HGB 14.3 12/14/2021    HCT 43.2 12/14/2021     12/14/2021        CMP:    Lab Results   Component Value Date     12/14/2021    K 4.0 12/14/2021     12/14/2021    CO2 28 12/14/2021    BUN 17 12/14/2021    CREATININE 1.21 12/14/2021    GLUCOSE 105 (H) 12/14/2021    CALCIUM 9.7 12/14/2021       Lipid Profile:    Lab Results   Component Value Date    TRIG 280 (H) 12/14/2021    HDL 53.0 12/14/2021       BMP:  Lab Results   Component Value Date     12/14/2021     06/09/2020     11/05/2019    K 4.0 12/14/2021    K 4.2 06/09/2020    K 4.0 11/05/2019     12/14/2021     06/09/2020     11/05/2019    CO2  28 12/14/2021    CO2 28 06/09/2020    CO2 28 11/05/2019    BUN 17 12/14/2021    BUN 20 06/09/2020    BUN 18 11/05/2019    CREATININE 1.21 12/14/2021    CREATININE 1.12 06/09/2020    CREATININE 1.14 11/05/2019       CBC:  Lab Results   Component Value Date    WBC 9.2 12/14/2021    RBC 4.59 12/14/2021    HGB 14.3 12/14/2021    HCT 43.2 12/14/2021    MCV 94 12/14/2021    MCHC 33.1 12/14/2021    RDW 14.2 12/14/2021     12/14/2021       Hepatic Function Panel:    Lab Results   Component Value Date    ALKPHOS 54 12/14/2021    ALT 24 12/14/2021    AST 22 12/14/2021    PROT 7.1 12/14/2021    BILITOT 0.6 12/14/2021       Diagnostic Studies:     Electrocardiogram 12 Lead    Result Date: 2/14/2023  Normal sinus rhythm Low voltage QRS Borderline ECG When compared with ECG of 18-MAY-2016 14:41, No significant change was found Confirmed by SANTOS CAMACHO MD ([id number]) on 12/11/2016 3:58:35 PM    Electrocardiogram 12 Lead    Result Date: 2/3/2023  Normal sinus rhythm Normal ECG When compared with ECG of 06-SEP-2016 11:58, No significant change was found Confirmed by CHUYITA CASTLE MD ([id number]) on 12/21/2016 12:40:00 PM Also confirmed by JUAN WINTER MD ([id number])  on 12/21/2016 4:53:12 PM    CT HEAD WO IV CONTRAST    Result Date: 12/9/2016  CT Brain  Clinical information: Status post craniotomy for subdural  Technique: Axial CT of the head was carried out.  Contrast media: None  Comparison: CT of the head without and with contrast from 12/8/2016  Findings: When compared to previous study there has been placement of a subdural drain through a left frontal job hole.  Expected postoperative sequela are seen in the scalp.  The subdural drain is seen to course with its tip terminating within 16 mm of the falx cerebri.  The thickest portion of the left convexity subdural collection measures 18 mm, previously having measured 20 mm, however, this portion overlies the left frontoparietal convexity and is situated several centimeters from  the drain tip..  Small areas of pneumocephalus are seen.  The subdural collection is mildly heterogeneous and possibility of loculation of the collection is raised.  There is no change in the 4 mm of left-to-right shift.  Again noted is the left para cavernous aneurysm clip.  Impression: Persisting subdural collection over the left frontoparietal convexity.  Examination reviewed at [facility]    XR CHEST 1 VIEW    Result Date: 12/9/2016  CHEST X-RAY ONE VIEW  CLINICAL DATA: Preop clearance  COMPARISON: Chest x-ray from the same day  Single frontal view of the chest was obtained and is provided for interpretation.  FINDINGS:  Stable cardiomediastinal silhouette in size and configuration.  Lungs are clear.  Limited exam as left costophrenic angle is not included.  No right pleural effusion.  No sizable pneumothorax.  IMPRESSION:  1. Within the limits of this exam as above, no acute cardiopulmonary process.   I personally reviewed the image(s)/study and resident/fellow interpretation. I agree with the findings as stated.  This study was interpreted at [facility], [place], [place].      No results found for this or any previous visit from the past 1095 days.      === 06/08/21 ===    - Impression -  Borderline abnormal Lexiscan Myoview cardiac perfusion stress test.  No evidence of myocardial ischemia by perfusion imaging.  Small fixed defect posterolateral wall which may well be diaphragm  attenuation in that there is some wall thickening in this region.  Normal left ventricular systolic function.  Left ventricular ejection fraction 56 %.  Overall low risk pattern stress test. Although initial exercise did  not achieve 85% target heart rate he did achieve 7 M ET of exercise  which is excellent for his age and gender. This was not associated  with EKG changes or angina. Lexiscan showed no inducible ischemia  with preserved LV function. There is a question of  posterolateral  infarct versus soft tissue attenuation but this involves throughout  small portion of the myocardium..      Problem List:     Patient Active Problem List   Diagnosis    Acute bronchitis    Atherosclerosis of native coronary artery without angina pectoris    Chronic obstructive pulmonary disease (CMS/[facility])    Elevated blood sugar    Esophageal reflux    Fatigue    Hyperlipidemia    Hypertension, essential, benign    Intracranial aneurysm    Nonischemic cardiomyopathy (CMS/HCC)    SOB (shortness of breath) on exertion    Subdural hematoma (CMS/[facility])    Dizzinesses    Vertigo       Asessment:     Problem List Items Addressed This Visit             ICD-10-CM    Atherosclerosis of native coronary artery without angina pectoris I25.10    Relevant Medications    metoprolol succinate XL (Toprol-XL) 50 mg 24 hr tablet    Chronic obstructive pulmonary disease (CMS/[facility]) J44.9    Hyperlipidemia E78.5    Hypertension, essential, benign I10    Nonischemic cardiomyopathy (CMS/[facility]) I42.8    Relevant Medications    metoprolol succinate XL (Toprol-XL) 50 mg 24 hr tablet    Frequent PVCs I49.3    Relevant Medications    metoprolol succinate XL (Toprol-XL) 50 mg 24 hr tablet    Other Relevant Orders    Referral to Cardiac Electrophysiology    Follow Up In Cardiology    NSVT (nonsustained ventricular tachycardia) (CMS/[facility]) I47.29    Relevant Medications    metoprolol succinate XL (Toprol-XL) 50 mg 24 hr tablet    PAF (paroxysmal atrial fibrillation) (CMS/[facility]) - Primary I48.0    Relevant Medications    metoprolol succinate XL (Toprol-XL) 50 mg 24 hr tablet     Other Visit Diagnoses         Codes    PVC (premature ventricular contraction)     I49.3    Relevant Medications    metoprolol succinate XL (Toprol-XL) 50 mg 24 hr tablet    Ventricular arrhythmia     I49.9    Relevant Medications    metoprolol succinate XL (Toprol-XL) 50 mg 24 hr tablet    Other Relevant Orders    MR cardiac morphology and function w and wo IV  contrast            Scribe Attestation  By signing my name below, I, Ximena Gomez LPN , Scribe   attest that this documentation has been prepared under the direction and in the presence of Tyler Drake MD.    Provider Attestation - Scribe documentation    All medical record entries made by the Scribe were at my direction and personally dictated by me. I have reviewed the chart and agree that the record accurately reflects my personal performance of the history, physical exam, discussion and plan.

## 2024-01-06 PROBLEM — I48.0 PAF (PAROXYSMAL ATRIAL FIBRILLATION) (MULTI): Status: ACTIVE | Noted: 2024-01-06

## 2024-01-06 PROBLEM — I47.29 NSVT (NONSUSTAINED VENTRICULAR TACHYCARDIA) (MULTI): Status: ACTIVE | Noted: 2024-01-06

## 2024-01-06 PROBLEM — I49.3 FREQUENT PVCS: Status: ACTIVE | Noted: 2024-01-06

## 2024-01-08 DIAGNOSIS — I48.19 PERSISTENT ATRIAL FIBRILLATION (MULTI): ICD-10-CM

## 2024-01-30 ENCOUNTER — TELEPHONE (OUTPATIENT)
Dept: CARDIOLOGY | Facility: CLINIC | Age: 84
End: 2024-01-30
Payer: MEDICARE

## 2024-02-02 ENCOUNTER — APPOINTMENT (OUTPATIENT)
Dept: CARDIOLOGY | Facility: CLINIC | Age: 84
End: 2024-02-02
Payer: MEDICARE

## 2024-02-02 ENCOUNTER — HOSPITAL ENCOUNTER (OUTPATIENT)
Dept: RADIOLOGY | Facility: CLINIC | Age: 84
Discharge: HOME | End: 2024-02-02
Payer: MEDICARE

## 2024-02-02 DIAGNOSIS — I49.9 VENTRICULAR ARRHYTHMIA: ICD-10-CM

## 2024-02-05 ENCOUNTER — HOSPITAL ENCOUNTER (OUTPATIENT)
Dept: RADIOLOGY | Facility: CLINIC | Age: 84
Discharge: HOME | End: 2024-02-05
Payer: MEDICARE

## 2024-02-05 PROCEDURE — 75561 CARDIAC MRI FOR MORPH W/DYE: CPT | Performed by: INTERNAL MEDICINE

## 2024-02-05 PROCEDURE — 75565 CARD MRI VELOC FLOW MAPPING: CPT | Performed by: INTERNAL MEDICINE

## 2024-02-05 PROCEDURE — 75561 CARDIAC MRI FOR MORPH W/DYE: CPT

## 2024-02-05 PROCEDURE — 2550000001 HC RX 255 CONTRASTS: Mod: MUE | Performed by: INTERNAL MEDICINE

## 2024-02-05 PROCEDURE — A9575 INJ GADOTERATE MEGLUMI 0.1ML: HCPCS | Mod: MUE | Performed by: INTERNAL MEDICINE

## 2024-02-05 PROCEDURE — 75565 CARD MRI VELOC FLOW MAPPING: CPT

## 2024-02-05 RX ORDER — GADOTERATE MEGLUMINE 376.9 MG/ML
34 INJECTION INTRAVENOUS
Status: COMPLETED | OUTPATIENT
Start: 2024-02-05 | End: 2024-02-05

## 2024-02-05 RX ADMIN — GADOTERATE MEGLUMINE 34 ML: 376.9 INJECTION INTRAVENOUS at 13:43

## 2024-02-07 ENCOUNTER — TELEPHONE (OUTPATIENT)
Dept: CARDIOLOGY | Facility: CLINIC | Age: 84
End: 2024-02-07
Payer: MEDICARE

## 2024-02-07 NOTE — TELEPHONE ENCOUNTER
----- Message from Tyler Drake MD sent at 2/6/2024  6:44 PM EST -----  Cardiac MRI reviewed and looks fine.  Overall mild pumping dysfunction.  Estimated LV ejection fraction 45%.  Normal is typically 50 to 60%.  There is an area of scarring suggesting some mild heart muscle damage.  No significant blocked arteries on previous evaluations.  He was recently changed to Toprol XL.  Continue same medications.  Keep scheduled EP follow-up appointment with Dr. Peters next month.  Thanks.

## 2024-02-09 ENCOUNTER — APPOINTMENT (OUTPATIENT)
Dept: CARDIOLOGY | Facility: CLINIC | Age: 84
End: 2024-02-09
Payer: MEDICARE

## 2024-02-12 ENCOUNTER — OFFICE VISIT (OUTPATIENT)
Dept: CARDIOLOGY | Facility: CLINIC | Age: 84
End: 2024-02-12
Payer: MEDICARE

## 2024-02-12 VITALS
WEIGHT: 194.6 LBS | HEART RATE: 68 BPM | SYSTOLIC BLOOD PRESSURE: 110 MMHG | HEIGHT: 69 IN | DIASTOLIC BLOOD PRESSURE: 58 MMHG | BODY MASS INDEX: 28.82 KG/M2

## 2024-02-12 DIAGNOSIS — I10 HYPERTENSION, ESSENTIAL, BENIGN: ICD-10-CM

## 2024-02-12 DIAGNOSIS — I48.19 PERSISTENT ATRIAL FIBRILLATION (MULTI): ICD-10-CM

## 2024-02-12 DIAGNOSIS — J44.9 CHRONIC OBSTRUCTIVE PULMONARY DISEASE, UNSPECIFIED COPD TYPE (MULTI): ICD-10-CM

## 2024-02-12 DIAGNOSIS — I47.29 NSVT (NONSUSTAINED VENTRICULAR TACHYCARDIA) (MULTI): ICD-10-CM

## 2024-02-12 DIAGNOSIS — I49.3 FREQUENT PVCS: ICD-10-CM

## 2024-02-12 DIAGNOSIS — I48.0 PAF (PAROXYSMAL ATRIAL FIBRILLATION) (MULTI): ICD-10-CM

## 2024-02-12 DIAGNOSIS — E78.2 MIXED HYPERLIPIDEMIA: ICD-10-CM

## 2024-02-12 DIAGNOSIS — I25.10 ATHEROSCLEROSIS OF NATIVE CORONARY ARTERY OF NATIVE HEART WITHOUT ANGINA PECTORIS: Primary | ICD-10-CM

## 2024-02-12 PROCEDURE — 1036F TOBACCO NON-USER: CPT | Performed by: INTERNAL MEDICINE

## 2024-02-12 PROCEDURE — 1159F MED LIST DOCD IN RCRD: CPT | Performed by: INTERNAL MEDICINE

## 2024-02-12 PROCEDURE — 3074F SYST BP LT 130 MM HG: CPT | Performed by: INTERNAL MEDICINE

## 2024-02-12 PROCEDURE — 99214 OFFICE O/P EST MOD 30 MIN: CPT | Performed by: INTERNAL MEDICINE

## 2024-02-12 PROCEDURE — 3078F DIAST BP <80 MM HG: CPT | Performed by: INTERNAL MEDICINE

## 2024-02-12 RX ORDER — CLOBETASOL PROPIONATE 0.46 MG/ML
SOLUTION TOPICAL 2 TIMES DAILY
COMMUNITY
Start: 2024-02-01

## 2024-02-12 RX ORDER — KETOCONAZOLE 20 MG/ML
SHAMPOO, SUSPENSION TOPICAL DAILY PRN
COMMUNITY
Start: 2024-01-18

## 2024-02-12 RX ORDER — METOPROLOL SUCCINATE 50 MG/1
50 TABLET, EXTENDED RELEASE ORAL DAILY
Qty: 100 TABLET | Refills: 2 | Status: SHIPPED | OUTPATIENT
Start: 2024-02-12 | End: 2024-03-29 | Stop reason: SDUPTHER

## 2024-02-12 RX ORDER — CARVEDILOL 3.12 MG/1
3.12 TABLET ORAL
COMMUNITY
Start: 2023-10-02 | End: 2024-02-12

## 2024-02-12 NOTE — PROGRESS NOTES
Patient:  Oscar Chavez  YOB: 1940  MRN: [id number]       HPI:       Oscar Chavez is a 83 y.o. male who returns today for cardiac follow-up.   He is accompanied by his wife. He has a history of nonischemic dilated cardiomyopathy. His ejection fractions were as low as 30% with most recent echocardiogram June 22, 2018 showing an LVEF of 55-60%. Remote cardiac catheterization in 2001 revealed 40-60% mid LAD with 10-20% RCA and luminal irregularities of circumflex EF was 30-35% at that time. A CT angiogram in 2009 showed 50% LAD stenosis with otherwise minimal disease. He has essential hypertension and hyperlipidemia. In December 2016 he underwent resection of an intercerebral aneurysm after presenting with an episode of severe vertigo per Dr. Rodger Sarmiento at [facility]. His postop course was complicated by a subdural hematoma for which he underwent redo craniotomy and drainage. He had initial expressive aphasia which normalized.      He underwent a Lexiscan myocardial perfusion study on June 8, 2021 for evaluation of shortness of breath. The study was negative for ischemia. There was some diaphragmatic attenuation artifact. Calculated LV ejection fraction was 56%. He underwent PFTs and was noted to have mild obstructive disease.  He was seen by Dr. Johns. He was placed on nocturnal oxygen and given albuterol. He states his symptoms have improved.      At the time of his last visit he was noted to have some irregularities of his heart rhythm.  EKG in the office showed normal sinus rhythm with PVCs versus PACs with aberrant conduction.  He was started back on for Toprol-XL 25 mg daily.  He was also advised to start back on simvastatin.  An echocardiogram on December 18, 2023 showed estimated LV ejection fraction of 55 to 60%.  There was mild (1+) mitral and tricuspid regurgitation with estimated RVSP 21 mmHg.  The ascending aorta measured 3.8 cm.  A 24-hour Holter monitor on December 18,  2023 showed normal sinus rhythm with PVCs comprising approximately 10% of the overall rhythm.  There were periods of ventricular bigeminy.  There was one 9 beat run of atrial tachycardia at 154 bpm.  He was reported to be in atrial fibrillation approximately 4% of the time.  Review of the rhythm strips shows that the majority of time in atrial fibrillation was reported there were P waves consistent with sinus rhythm or atrial tachycardia.  He did have a few relatively brief episodes of atrial fibrillation however.  A cardiac MRI on February 5, 2014 showed a calculated LV ejection fraction 45%.  The basal anterior septum measured 14 mm.  Delayed enhancement revealed transmural myocardial infarction of the basal mid to distal inferolateral wall.  There was transmural myocardial infarction of the basal mid anterolateral wall.  Calculated RV ejection fraction was 56%.  No evidence of ARVC.     He continues to do well since his last visit. He remains reasonably active without limitations.   He denies any chest discomfort. He denies any orthopnea, PND, or increasing peripheral edema. He denies any palpitations, lightheadedness, near-syncope, or syncope. He denies any fever, chills, or cough. He denies any nausea, vomiting, or diaphoresis. He denies any hemoptysis, hematemesis, melena, or hematochezia. His blood pressures are well controlled.  Other details as noted below.     The above portion of this note was dictated by me using voice recognition software. I personally performed the services described in the documentation. The scribe entering the documentation below was in my presence. I affirm that the information is both accurate and complete.      Objective:     Vitals:    02/12/24 1453   BP: 110/58   Pulse: 68       Wt Readings from Last 4 Encounters:   02/12/24 88.3 kg (194 lb 9.6 oz)   02/05/24 86.2 kg (190 lb)   01/05/24 86.2 kg (190 lb)   11/27/23 86.6 kg (191 lb)       Allergies:     No Known Allergies      Medications:     Current Outpatient Medications   Medication Instructions    albuterol 90 mcg/actuation inhaler 2 puffs, inhalation, Every 4 hours PRN    apixaban (ELIQUIS) 5 mg, oral, 2 times daily    aspirin 81 mg capsule 1 tablet, oral, Daily    clobetasol (Temovate) 0.05 % external solution Topical, 2 times daily, APPLY A SMALL AMOUNT EXTERNALLY TWICE A DAY FOR SKIN DISEASE OF THE SCALP<BR>    ketoconazole (NIZOral) 2 % shampoo Topical, Daily PRN    metoprolol succinate XL (TOPROL-XL) 50 mg, oral, Daily, Do not crush or chew.    multivitamin with minerals iron-free (Centrum Silver) 1 tablet, oral, Daily PRN       Physical Examination:   GENERAL:  Well developed, well nourished, in no acute distress.  CHEST:  Symmetric and nontender.  NEURO/PSYCH:  Alert and oriented times three with approppriate behavior and responses.  NECK:  Supple, no JVD, no bruit.  LUNGS:  Clear to auscultation bilaterally, normal respiratory effort.  HEART:  Rate and rhythm regular with no evident murmur, no gallop appreciated.        There are no rubs, clicks or heaves.  EXTREMITIES:  Warm with good color, no clubbing or cyanosis.  There is no edema noted.  PERIPHERAL VASCULAR:  Pulses present and equally palpable; 2+ throughout.      Lab:     CBC:   Lab Results   Component Value Date    WBC 9.2 12/14/2021    RBC 4.59 12/14/2021    HGB 14.3 12/14/2021    HCT 43.2 12/14/2021     12/14/2021        CMP:    Lab Results   Component Value Date     12/14/2021    K 4.0 12/14/2021     12/14/2021    CO2 28 12/14/2021    BUN 17 12/14/2021    CREATININE 1.21 12/14/2021    GLUCOSE 105 (H) 12/14/2021    CALCIUM 9.7 12/14/2021       Lipid Profile:    Lab Results   Component Value Date    TRIG 280 (H) 12/14/2021    HDL 53.0 12/14/2021       BMP:  Lab Results   Component Value Date     12/14/2021     06/09/2020     11/05/2019    K 4.0 12/14/2021    K 4.2 06/09/2020    K 4.0 11/05/2019     12/14/2021      06/09/2020     11/05/2019    CO2 28 12/14/2021    CO2 28 06/09/2020    CO2 28 11/05/2019    BUN 17 12/14/2021    BUN 20 06/09/2020    BUN 18 11/05/2019    CREATININE 1.21 12/14/2021    CREATININE 1.12 06/09/2020    CREATININE 1.14 11/05/2019       CBC:  Lab Results   Component Value Date    WBC 9.2 12/14/2021    RBC 4.59 12/14/2021    HGB 14.3 12/14/2021    HCT 43.2 12/14/2021    MCV 94 12/14/2021    MCHC 33.1 12/14/2021    RDW 14.2 12/14/2021     12/14/2021       Hepatic Function Panel:    Lab Results   Component Value Date    ALKPHOS 54 12/14/2021    ALT 24 12/14/2021    AST 22 12/14/2021    PROT 7.1 12/14/2021    BILITOT 0.6 12/14/2021       Diagnostic Studies:     MR cardiac morphology and function w and wo IV contrast  Result Date: 2/6/2024    1.  Normal left ventricular cavity size with mild-moderately depressed systolic function. Ejection fraction 45%. 2.   Asymmetric septal left ventricular hypertrophy. Basal anterior septum 1.4 cm. 3.  Delayed enhancement imaging reveals transmural myocardial infarction of the basal/mid/distal inferolateral wall. Subendocardial non transmural myocardial infarction of the basal/mid anterolateral wall. 4.  Normal right ventricular cavity size with preserved systolic function. RV EF 56%. No CMR evidence of ARVC.     MACRO: None   Signed by: Fabrizio Julian 2/6/2024 10:29 AM       === 06/08/21 ===  - Impression -  Borderline abnormal Zephyriscan Myoview cardiac perfusion stress test.  No evidence of myocardial ischemia by perfusion imaging.  Small fixed defect posterolateral wall which may well be diaphragm  attenuation in that there is some wall thickening in this region.  Normal left ventricular systolic function.  Left ventricular ejection fraction 56 %.  Overall low risk pattern stress test. Although initial exercise did  not achieve 85% target heart rate he did achieve 7 M ET of exercise  which is excellent for his age and gender. This was not associated  with EKG  changes or angina. Lexiscan showed no inducible ischemia  with preserved LV function. There is a question of posterolateral  infarct versus soft tissue attenuation but this involves throughout  small portion of the myocardium..      Problem List:     Patient Active Problem List   Diagnosis    Acute bronchitis    Atherosclerosis of native coronary artery without angina pectoris    Chronic obstructive pulmonary disease (CMS/[facility])    Elevated blood sugar    Esophageal reflux    Fatigue    Hyperlipidemia    Hypertension, essential, benign    Intracranial aneurysm    Nonischemic cardiomyopathy (CMS/HCC)    SOB (shortness of breath) on exertion    Subdural hematoma (CMS/[facility])    Dizzinesses    Vertigo    Frequent PVCs    NSVT (nonsustained ventricular tachycardia) (CMS/[facility])    PAF (paroxysmal atrial fibrillation) (CMS/[facility])       Asessment:     Problem List Items Addressed This Visit             ICD-10-CM    Atherosclerosis of native coronary artery without angina pectoris - Primary I25.10    Relevant Medications    metoprolol succinate XL (Toprol-XL) 50 mg 24 hr tablet    Chronic obstructive pulmonary disease (CMS/[facility]) J44.9    Hyperlipidemia E78.5    Relevant Orders    Lipid Panel    Hypertension, essential, benign I10    Frequent PVCs I49.3    Relevant Medications    metoprolol succinate XL (Toprol-XL) 50 mg 24 hr tablet    Other Relevant Orders    Follow Up In Cardiology    CBC    Comprehensive Metabolic Panel    NSVT (nonsustained ventricular tachycardia) (CMS/[facility]) I47.29    Relevant Medications    metoprolol succinate XL (Toprol-XL) 50 mg 24 hr tablet    PAF (paroxysmal atrial fibrillation) (CMS/[facility]) I48.0    Relevant Medications    metoprolol succinate XL (Toprol-XL) 50 mg 24 hr tablet     Other Visit Diagnoses         Codes    Persistent atrial fibrillation (CMS/[facility])     I48.19    Relevant Medications    metoprolol succinate XL (Toprol-XL) 50 mg 24 hr tablet    apixaban (Eliquis) 5 mg tablet    Other Relevant Orders     Follow Up In Cardiology    CBC    Comprehensive Metabolic Panel            Plan:     -He will be continued on his same medications.     -He was advised to restrict sodium with an aim of no more than 2 grams per day.     -He was advised on the need for regular exercise with an aim to walk at least 20-30 minutes on at least 5 days per week.    -We discussed the aim for a healthy weight.     -He is scheduled to see Dr. Peters in the near future.    -He was advised to seek medical attention for any new or worsening symptoms.    -He will follow-up in 6 months or sooner if problems.

## 2024-02-22 ENCOUNTER — OFFICE VISIT (OUTPATIENT)
Dept: PULMONOLOGY | Age: 84
End: 2024-02-22
Payer: MEDICARE

## 2024-02-22 VITALS
BODY MASS INDEX: 27.26 KG/M2 | HEART RATE: 55 BPM | DIASTOLIC BLOOD PRESSURE: 70 MMHG | OXYGEN SATURATION: 95 % | WEIGHT: 190 LBS | SYSTOLIC BLOOD PRESSURE: 120 MMHG

## 2024-02-22 DIAGNOSIS — J20.9 ACUTE BRONCHITIS, UNSPECIFIED ORGANISM: ICD-10-CM

## 2024-02-22 DIAGNOSIS — J44.9 CHRONIC OBSTRUCTIVE PULMONARY DISEASE, UNSPECIFIED COPD TYPE (HCC): Primary | ICD-10-CM

## 2024-02-22 PROCEDURE — 1123F ACP DISCUSS/DSCN MKR DOCD: CPT | Performed by: INTERNAL MEDICINE

## 2024-02-22 PROCEDURE — 1036F TOBACCO NON-USER: CPT | Performed by: INTERNAL MEDICINE

## 2024-02-22 PROCEDURE — G8417 CALC BMI ABV UP PARAM F/U: HCPCS | Performed by: INTERNAL MEDICINE

## 2024-02-22 PROCEDURE — 3023F SPIROM DOC REV: CPT | Performed by: INTERNAL MEDICINE

## 2024-02-22 PROCEDURE — G8427 DOCREV CUR MEDS BY ELIG CLIN: HCPCS | Performed by: INTERNAL MEDICINE

## 2024-02-22 PROCEDURE — 99214 OFFICE O/P EST MOD 30 MIN: CPT | Performed by: INTERNAL MEDICINE

## 2024-02-22 PROCEDURE — G8484 FLU IMMUNIZE NO ADMIN: HCPCS | Performed by: INTERNAL MEDICINE

## 2024-02-22 RX ORDER — DOXYCYCLINE HYCLATE 100 MG
100 TABLET ORAL 2 TIMES DAILY
Qty: 20 TABLET | Refills: 0 | Status: SHIPPED | OUTPATIENT
Start: 2024-02-22 | End: 2024-02-22

## 2024-02-22 RX ORDER — METOPROLOL SUCCINATE 50 MG/1
50 TABLET, EXTENDED RELEASE ORAL DAILY
COMMUNITY
Start: 2024-02-12 | End: 2024-12-08

## 2024-02-22 RX ORDER — PREDNISONE 10 MG/1
TABLET ORAL
Qty: 40 TABLET | Refills: 0 | Status: SHIPPED | OUTPATIENT
Start: 2024-02-22

## 2024-02-22 RX ORDER — PREDNISONE 10 MG/1
TABLET ORAL
Qty: 40 TABLET | Refills: 0 | Status: SHIPPED | OUTPATIENT
Start: 2024-02-22 | End: 2024-02-22

## 2024-02-22 RX ORDER — DOXYCYCLINE HYCLATE 100 MG
100 TABLET ORAL 2 TIMES DAILY
Qty: 20 TABLET | Refills: 0 | Status: SHIPPED | OUTPATIENT
Start: 2024-02-22 | End: 2024-03-03

## 2024-02-22 NOTE — PROGRESS NOTES
Respiratory:  Positive for cough, shortness of breath and wheezing. Negative for chest tightness.    Cardiovascular: Negative.  Negative for chest pain, palpitations and leg swelling.   Gastrointestinal:  Negative for abdominal pain, diarrhea, nausea and vomiting.   Genitourinary:  Negative for difficulty urinating and hematuria.   Musculoskeletal:  Negative for arthralgias, joint swelling and myalgias.   Skin:  Negative for rash.   Allergic/Immunologic: Negative for environmental allergies.   Neurological:  Negative for dizziness, tremors, weakness and headaches.   Psychiatric/Behavioral:  Negative for behavioral problems and sleep disturbance.          :     Vitals:    02/22/24 1127   BP: 120/70   Site: Left Upper Arm   Position: Sitting   Cuff Size: Medium Adult   Pulse: 55   SpO2: 95%   Weight: 86.2 kg (190 lb)     Wt Readings from Last 3 Encounters:   02/22/24 86.2 kg (190 lb)   11/07/23 85.7 kg (189 lb)   08/03/23 86.2 kg (190 lb)         Physical Exam  Constitutional:       Appearance: He is well-developed.   HENT:      Head: Normocephalic and atraumatic.      Nose: Nose normal.   Eyes:      Conjunctiva/sclera: Conjunctivae normal.      Pupils: Pupils are equal, round, and reactive to light.   Neck:      Thyroid: No thyromegaly.      Vascular: No JVD.      Trachea: No tracheal deviation.   Cardiovascular:      Rate and Rhythm: Normal rate and regular rhythm.      Heart sounds: No murmur heard.     No friction rub. No gallop.   Pulmonary:      Effort: Pulmonary effort is normal. No respiratory distress.      Breath sounds: Wheezing present. No rales.      Comments: diminished Breath sound bilaterally.    Chest:      Chest wall: No tenderness.   Abdominal:      General: There is no distension.   Musculoskeletal:         General: Normal range of motion.   Lymphadenopathy:      Cervical: No cervical adenopathy.   Skin:     General: Skin is warm and dry.      Findings: No rash.   Neurological:      Mental

## 2024-03-29 ENCOUNTER — OFFICE VISIT (OUTPATIENT)
Dept: CARDIOLOGY | Facility: CLINIC | Age: 84
End: 2024-03-29
Payer: MEDICARE

## 2024-03-29 VITALS
SYSTOLIC BLOOD PRESSURE: 120 MMHG | WEIGHT: 194.6 LBS | DIASTOLIC BLOOD PRESSURE: 56 MMHG | BODY MASS INDEX: 28.74 KG/M2 | HEART RATE: 57 BPM

## 2024-03-29 DIAGNOSIS — I49.3 FREQUENT PVCS: ICD-10-CM

## 2024-03-29 DIAGNOSIS — I48.0 PAROXYSMAL ATRIAL FIBRILLATION (MULTI): Primary | ICD-10-CM

## 2024-03-29 DIAGNOSIS — I42.8 NONISCHEMIC CARDIOMYOPATHY (MULTI): ICD-10-CM

## 2024-03-29 DIAGNOSIS — Z87.891 FORMER SMOKER: ICD-10-CM

## 2024-03-29 DIAGNOSIS — I49.3 PVC (PREMATURE VENTRICULAR CONTRACTION): ICD-10-CM

## 2024-03-29 DIAGNOSIS — I10 HYPERTENSION, ESSENTIAL, BENIGN: ICD-10-CM

## 2024-03-29 PROCEDURE — 93000 ELECTROCARDIOGRAM COMPLETE: CPT | Performed by: INTERNAL MEDICINE

## 2024-03-29 PROCEDURE — 3078F DIAST BP <80 MM HG: CPT | Performed by: INTERNAL MEDICINE

## 2024-03-29 PROCEDURE — 99215 OFFICE O/P EST HI 40 MIN: CPT | Performed by: INTERNAL MEDICINE

## 2024-03-29 PROCEDURE — 3074F SYST BP LT 130 MM HG: CPT | Performed by: INTERNAL MEDICINE

## 2024-03-29 PROCEDURE — 1036F TOBACCO NON-USER: CPT | Performed by: INTERNAL MEDICINE

## 2024-03-29 PROCEDURE — 1159F MED LIST DOCD IN RCRD: CPT | Performed by: INTERNAL MEDICINE

## 2024-03-29 RX ORDER — METOPROLOL SUCCINATE 50 MG/1
50 TABLET, EXTENDED RELEASE ORAL NIGHTLY
Qty: 100 TABLET | Refills: 2
Start: 2024-03-29 | End: 2025-01-23

## 2024-03-29 NOTE — PATIENT INSTRUCTIONS
Dr. Pteers is changing your Metoprolol to be taken at night instead of the morning    Dr. Peters is ordering a 30 day KOH (Monitor) to monitor your heart     -Check your blood pressure twice daily. Once in the morning 1-2 hours after morning medication and again at bedtime. Please keep a blood pressure diary and bring readings to your next appointment.        -Please bring all medicines, vitamins, and herbal supplements with you in original bottles to every appointment!!!!    -Prescriptions will not be filled unless you are compliant with your follow up appointments or have a follow up appointment scheduled as per instruction of your physician. Refills should be requested at the time of your visit.

## 2024-03-29 NOTE — PROGRESS NOTES
CARDIOLOGY OFFICE VISIT      CHIEF COMPLAINT  Chief Complaint   Patient presents with    PVC       HISTORY OF PRESENT ILLNESS  HPI    84-year-old male with a past medical history of ischemic dilated cardiomyopathy with ejection fraction was low at 30% with echocardiogram in June 2018 showed left ventricular atrial fraction 55 to 60% an echocardiogram in 2023 shows left ventricular atrial fraction of 55 to 60%.  Remote cardiac catheterization in 2001 that revealed 40 to 60% mid LAD with 10 to 20% right coronary artery luminal irregularities of the circumflex with left ventricular ejection fraction of 30 to 35%.  CT angiogram in 2009 showed 50% stenosis of the LAD otherwise minimal disease.  History of hypertension, hyperlipidemia.    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  Stress test in June 2021 shows no evidence of ischemia with left ventricular action fraction 56%.  PFTs show mild obstructive disease.  A Holter monitor was ordered due to some irregular heartbeats.  Holter monitor showed underlying rhythm was sinus rhythm with occasional PVCs comprising approximately 10% of the total beats.  The morphology of the PVCs were polymorphic.  Also there were frequent brief episodes of nonsustained supraventricular tachycardia and some other rhythm strips are consistent actually with atrial fibrillation.  [place] of atrial fibrillation approximately 4% of the time.    A cardiac MRI was ordered in February 2014 that shows left ventricular ejection fraction 45% with basal anterior septum measuring 14 mm.  Delaying has been revealed transmural myocardial infarction in the basal mid to distal inferolateral wall and there was a transmural myocardial infarction of the basal mid anterolateral wall with right ventricular ejection fraction of 56%.    Overall he is doing well.  Based on results of the cardiac MRI and also Holter monitor, patient was placed on  Toprol-XL 50 mg 1 tablet daily and also patient was placed on Eliquis.  Since then he has been noticing episodes of dizziness especially with postural changes and also some shortness of breath.    EKG performed today shows sinus bradycardia with PVCs at a rate of 56 bpm QRS ration 100 ms QT corrected 394 ms.  Rhythm strip shows the same pattern.          Echocardiogram 2023    CONCLUSIONS:   1. Left ventricular systolic function is normal with a 55-60% estimated ejection fraction.   2. Spectral Doppler shows an impaired relaxation pattern of left ventricular diastolic filling.   3. Mild (1+) mitral regurgitation.   4. Mild (1+) tricuspid regurgitation with estimated RVSP of 21 mmHg.   5. The ascending aorta measures 3.8 cm.   6. No previous available for comparison.      Holter monitor December 2023    Total of 8041 ventricular ectopic beats, comprising 9.8% of the total QRS complexes.  95 ventricular couplets  Several episodes of ventricular bigeminy  Supraventricular premature beats comprise 1.5% of total QRS complexes  Atrial runs, longest 9 beats at 5:59 PM fastest 154 beats at 2:11 AM.  Longest R to R interval 1.9 seconds  Patient was in atrial fibrillation 4.2% of the monitoring interval.  Ventricular rate was controlled in atrial fibrillation.  There was significant baseline artifact, and several strips were erased.     Summary:  1.  This is an abnormal Holter monitor  2.  Ventricular ectopy comprised 9.8% of the total QRS complexes  3.  Atrial fibrillation was noted, with controlled ventricular rate  4.  There were several runs of atrial tachycardia, short, no symptoms were reported, maximum ventricular rate 154 bpm at 2:11 AM.  5.  Left atrial abnormality and first-degree AV block are noted  6.  QRS appears widened.      MR cardiac morphology and function w and wo IV contrast  Result Date: 2/6/2024     1.  Normal left ventricular cavity size with mild-moderately depressed systolic function. Ejection  fraction 45%. 2.   Asymmetric septal left ventricular hypertrophy. Basal anterior septum 1.4 cm. 3.  Delayed enhancement imaging reveals transmural myocardial infarction of the basal/mid/distal inferolateral wall. Subendocardial non transmural myocardial infarction of the basal/mid anterolateral wall. 4.  Normal right ventricular cavity size with preserved systolic function. RV EF 56%. No CMR evidence of ARVC.     MACRO: None   Signed by: Fabrizio Julian 2024 10:29 AM         === 21 ===  - Impression -  Borderline abnormal Lexiscan Myoview cardiac perfusion stress test.  No evidence of myocardial ischemia by perfusion imaging.  Small fixed defect posterolateral wall which may well be diaphragm  attenuation in that there is some wall thickening in this region.  Normal left ventricular systolic function.  Left ventricular ejection fraction 56 %.  Overall low risk pattern stress test. Although initial exercise did  not achieve 85% target heart rate he did achieve 7 M ET of exercise  which is excellent for his age and gender. This was not associated  with EKG changes or angina. Lexiscan showed no inducible ischemia  with preserved LV function. There is a question of posterolateral  infarct versus soft tissue attenuation but this involves throughout  small portion of the myocardium..      Past Medical History  History reviewed. No pertinent past medical history.    Social History  Social History     Tobacco Use    Smoking status: Former     Types: Cigarettes     Quit date:      Years since quittin.2    Smokeless tobacco: Never   Substance Use Topics    Alcohol use: Not Currently    Drug use: Not Currently       Family History   No family history on file.     Allergies:  No Known Allergies     Outpatient Medications:  Current Outpatient Medications   Medication Instructions    albuterol 90 mcg/actuation inhaler 2 puffs, inhalation, Every 4 hours PRN    apixaban (ELIQUIS) 5 mg, oral, 2 times daily     clobetasol (Temovate) 0.05 % external solution Topical, 2 times daily, APPLY A SMALL AMOUNT EXTERNALLY TWICE A DAY FOR SKIN DISEASE OF THE SCALP<BR>    ketoconazole (NIZOral) 2 % shampoo Topical, Daily PRN    metoprolol succinate XL (TOPROL-XL) 50 mg, oral, Daily, Do not crush or chew.    multivitamin with minerals iron-free (Centrum Silver) 1 tablet, oral, Daily PRN          REVIEW OF SYSTEMS  Review of Systems   All other systems reviewed and are negative.              VITALS  Vitals:    03/29/24 0944   BP: 120/56   Pulse: 57       PHYSICAL EXAM  Constitutional:       Appearance: Healthy appearance. Not in distress.   Neck:      Vascular: No JVR. JVD normal.   Pulmonary:      Effort: Pulmonary effort is normal.      Breath sounds: Normal breath sounds. No wheezing. No rhonchi. No rales.   Chest:      Chest wall: Not tender to palpatation.   Cardiovascular:      PMI at left midclavicular line. Normal rate. Regular rhythm. Normal S1. Normal S2.       Murmurs: There is no murmur.      No gallop.  No click. No rub.   Pulses:     Intact distal pulses.   Edema:     Peripheral edema absent.   Abdominal:      General: Bowel sounds are normal.      Palpations: Abdomen is soft.      Tenderness: There is no abdominal tenderness.   Musculoskeletal: Normal range of motion.         General: No tenderness. Skin:     General: Skin is warm and dry.   Neurological:      General: No focal deficit present.      Mental Status: Alert and oriented to person, place and time.           ASSESSMENT AND PLAN    Clinical impression    1.  History of nonischemic cardiomyopathy with a left ventricular action fraction was low at 3% with recent echocardiogram showing left ventricle action fraction 45%  2.  History of coronary artery disease with mild to moderate disease per cardiac catheterization in 2001  3.  History of hypertension  4.  History of hyperlipidemia  5.  PVCs, frequent by Holter monitor in 2024  6.  Brief episodes of atrial  fibrillation with episodes of rapid ventricular response, on beta-blocker therapy  7.  Long-term anticoagulant therapy with Eliquis  8.  Shortness of breath  9.  Dizziness after starting beta-blocker therapy  10.  Congestive heart failure, new [place] Heart Association class II    Plan-recommendations    I had a lengthy discussion with patient and family member regarding results of the Holter monitor.  Patient definitely has frequent PVCs but also different morphology (at least 3 morphologies seen on Holter monitor) and also brief episodes of SVT that may be related with brief episodes of atrial fibrillation.    I agree with Eliquis therapy.    Patient should continue with metoprolol succinate 50 mg 1 tablet twice a day.    Patient was instructed to check his blood pressure twice a day and bring the blood pressure readings to the next appointment    Get an event monitor for 30 days for evaluation of atrial arrhythmias or bradycardia arrhythmias related with dizziness.    Follow my office in the next 4 to 6 weeks or sooner if needed.    Risk factor modification and lifestyle modification discussed with patient. Diet , exercise and hydration discussed with patient.    I have personally review with patient during this office visit, laboratory data, echocardiogram results, stress test results, Holter-event monitor results prior and after the last electrophysiology visit. All questions has been answered.    Please excuse any errors in grammar or translation related to this dictation.  Voice recognition software was utilized to prepare this document.      Scribe Attestation  By signing my name below, I, Ximena Gomez LPN , Scribe   attest that this documentation has been prepared under the direction and in the presence of José Miguel Peters MD.

## 2024-04-11 ENCOUNTER — ANCILLARY PROCEDURE (OUTPATIENT)
Dept: CARDIOLOGY | Facility: CLINIC | Age: 84
End: 2024-04-11
Payer: MEDICARE

## 2024-04-11 DIAGNOSIS — I49.3 PVC (PREMATURE VENTRICULAR CONTRACTION): ICD-10-CM

## 2024-04-11 DIAGNOSIS — I48.0 PAROXYSMAL ATRIAL FIBRILLATION (MULTI): ICD-10-CM

## 2024-04-11 PROCEDURE — 93272 ECG/REVIEW INTERPRET ONLY: CPT | Performed by: INTERNAL MEDICINE

## 2024-06-20 ENCOUNTER — APPOINTMENT (OUTPATIENT)
Dept: CARDIOLOGY | Facility: CLINIC | Age: 84
End: 2024-06-20
Payer: MEDICARE

## 2024-06-20 VITALS
HEIGHT: 69 IN | SYSTOLIC BLOOD PRESSURE: 126 MMHG | HEART RATE: 71 BPM | WEIGHT: 187.2 LBS | BODY MASS INDEX: 27.73 KG/M2 | DIASTOLIC BLOOD PRESSURE: 58 MMHG

## 2024-06-20 DIAGNOSIS — I48.0 PAROXYSMAL ATRIAL FIBRILLATION (MULTI): ICD-10-CM

## 2024-06-20 DIAGNOSIS — I49.3 FREQUENT PVCS: ICD-10-CM

## 2024-06-20 DIAGNOSIS — Z87.891 FORMER SMOKER: ICD-10-CM

## 2024-06-20 DIAGNOSIS — I49.3 PVC (PREMATURE VENTRICULAR CONTRACTION): ICD-10-CM

## 2024-06-20 DIAGNOSIS — I10 HYPERTENSION, ESSENTIAL, BENIGN: ICD-10-CM

## 2024-06-20 DIAGNOSIS — I42.8 NONISCHEMIC CARDIOMYOPATHY (MULTI): ICD-10-CM

## 2024-06-20 PROCEDURE — 3078F DIAST BP <80 MM HG: CPT | Performed by: INTERNAL MEDICINE

## 2024-06-20 PROCEDURE — 99215 OFFICE O/P EST HI 40 MIN: CPT | Performed by: INTERNAL MEDICINE

## 2024-06-20 PROCEDURE — 3074F SYST BP LT 130 MM HG: CPT | Performed by: INTERNAL MEDICINE

## 2024-06-20 PROCEDURE — 1036F TOBACCO NON-USER: CPT | Performed by: INTERNAL MEDICINE

## 2024-06-20 RX ORDER — METOPROLOL SUCCINATE 50 MG/1
50 TABLET, EXTENDED RELEASE ORAL NIGHTLY
Qty: 90 TABLET | Refills: 3 | Status: CANCELLED | OUTPATIENT
Start: 2024-06-20 | End: 2025-06-20

## 2024-06-20 RX ORDER — METOPROLOL SUCCINATE 50 MG/1
50 TABLET, EXTENDED RELEASE ORAL NIGHTLY
Qty: 90 TABLET | Refills: 3 | Status: SHIPPED | OUTPATIENT
Start: 2024-06-20 | End: 2025-06-20

## 2024-06-20 RX ORDER — METOPROLOL SUCCINATE 50 MG/1
50 TABLET, EXTENDED RELEASE ORAL NIGHTLY
Qty: 90 TABLET | Refills: 3 | Status: SHIPPED
Start: 2024-06-20 | End: 2024-06-20

## 2024-06-20 NOTE — PROGRESS NOTES
CARDIOLOGY OFFICE VISIT      CHIEF COMPLAINT  Chief Complaint   Patient presents with    Results     KOH 4/11/2024       HISTORY OF PRESENT ILLNESS  HPI  84-year-old male with a past medical history of ischemic dilated cardiomyopathy with ejection fraction was low at 30% with echocardiogram in June 2018 showed left ventricular atrial fraction 55 to 60% an echocardiogram in 2023 shows left ventricular atrial fraction of 55 to 60%.  Remote cardiac catheterization in 2001 that revealed 40 to 60% mid LAD with 10 to 20% right coronary artery luminal irregularities of the circumflex with left ventricular ejection fraction of 30 to 35%.  CT angiogram in 2009 showed 50% stenosis of the LAD otherwise minimal disease.  History of hypertension, hyperlipidemia.     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  Stress test in June 2021 shows no evidence of ischemia with left ventricular action fraction 56%.  PFTs show mild obstructive disease.  A Holter monitor was ordered due to some irregular heartbeats.  Holter monitor showed underlying rhythm was sinus rhythm with occasional PVCs comprising approximately 10% of the total beats.  The morphology of the PVCs were polymorphic.  Also there were frequent brief episodes of nonsustained supraventricular tachycardia and some other rhythm strips are consistent actually with atrial fibrillation.  [place] of atrial fibrillation approximately 4% of the time.     A cardiac MRI was ordered in February 2014 that shows left ventricular ejection fraction 45% with basal anterior septum measuring 14 mm.  Delaying has been revealed transmural myocardial infarction in the basal mid to distal inferolateral wall and there was a transmural myocardial infarction of the basal mid anterolateral wall with right ventricular ejection fraction of 56%.      Echocardiogram 2023     CONCLUSIONS:   1. Left ventricular systolic function is normal  with a 55-60% estimated ejection fraction.   2. Spectral Doppler shows an impaired relaxation pattern of left ventricular diastolic filling.   3. Mild (1+) mitral regurgitation.   4. Mild (1+) tricuspid regurgitation with estimated RVSP of 21 mmHg.   5. The ascending aorta measures 3.8 cm.   6. No previous available for comparison.        Holter monitor December 2023     Total of 8041 ventricular ectopic beats, comprising 9.8% of the total QRS complexes.  95 ventricular couplets  Several episodes of ventricular bigeminy  Supraventricular premature beats comprise 1.5% of total QRS complexes  Atrial runs, longest 9 beats at 5:59 PM fastest 154 beats at 2:11 AM.  Longest R to R interval 1.9 seconds  Patient was in atrial fibrillation 4.2% of the monitoring interval.  Ventricular rate was controlled in atrial fibrillation.  There was significant baseline artifact, and several strips were erased.     Summary:  1.  This is an abnormal Holter monitor  2.  Ventricular ectopy comprised 9.8% of the total QRS complexes  3.  Atrial fibrillation was noted, with controlled ventricular rate  4.  There were several runs of atrial tachycardia, short, no symptoms were reported, maximum ventricular rate 154 bpm at 2:11 AM.  5.  Left atrial abnormality and first-degree AV block are noted  6.  QRS appears widened.        MR cardiac morphology and function w and wo IV contrast  Result Date: 2/6/2024     1.  Normal left ventricular cavity size with mild-moderately depressed systolic function. Ejection fraction 45%. 2.   Asymmetric septal left ventricular hypertrophy. Basal anterior septum 1.4 cm. 3.  Delayed enhancement imaging reveals transmural myocardial infarction of the basal/mid/distal inferolateral wall. Subendocardial non transmural myocardial infarction of the basal/mid anterolateral wall. 4.  Normal right ventricular cavity size with preserved systolic function. RV EF 56%. No CMR evidence of ARVC.     MACRO: None   Signed by:  Fabrizio Julian 2/6/2024 10:29 AM         === 06/08/21 ===  - Impression -  Borderline abnormal Lexiscan Myoview cardiac perfusion stress test.  No evidence of myocardial ischemia by perfusion imaging.  Small fixed defect posterolateral wall which may well be diaphragm  attenuation in that there is some wall thickening in this region.  Normal left ventricular systolic function.  Left ventricular ejection fraction 56 %.  Overall low risk pattern stress test. Although initial exercise did  not achieve 85% target heart rate he did achieve 7 M ET of exercise  which is excellent for his age and gender. This was not associated  with EKG changes or angina. Lexiscan showed no inducible ischemia  with preserved LV function. There is a question of posterolateral  infarct versus soft tissue attenuation but this involves throughout  small portion of the myocardium..    Patient was placed on Eliquis therapy during the last office visit.    Patient states that so far he continues doing well.  Denies any symptoms of chest pain or shortness of breath or palpitations.    On event monitor was ordered.      Event monitor April 2024  This is an event monitor for 30 days from April 11, 2024 to May 11, 2024.     The underlying rhythm was sinus bradycardia rate of 49 bpm.     There were 4 patient triggered events and 8 auto triggered events.  All these events had no symptoms related.  All these events correlated with sinus rhythm-sinus bradycardia with PVCs.     There were 4 events related with atrial fibrillation.  The first 1 April 14, 2024 at 1:33 AM.  Rhythm strip was consistent with sinus bradycardia with atrial fibrillation at a rate of 56 bpm.     The second episode was April 23, 2024 at 12:38 AM.  Sinus bradycardia with atrial fibrillation rate 56 bpm.     The third episode was April 26, 2024 at 5:51 AM.  He was related with sinus bradycardia with atrial fibrillation rates for 48 bpm.     Therefore event was May 7, 2024 at 9:50 AM.   He was not auto trigger event.  Rhythm strip consistent with sinus bradycardia with atrial fibrillation rate of 52 bpm.  Clinical correlation is to be made         Past Medical History  No past medical history on file.    Social History  Social History     Tobacco Use    Smoking status: Former     Current packs/day: 0.00     Types: Cigarettes     Quit date:      Years since quittin.4    Smokeless tobacco: Never   Substance Use Topics    Alcohol use: Not Currently    Drug use: Not Currently       Family History   No family history on file.     Allergies:  No Known Allergies     Outpatient Medications:  Current Outpatient Medications   Medication Instructions    albuterol 90 mcg/actuation inhaler 2 puffs, inhalation, Every 4 hours PRN    apixaban (ELIQUIS) 5 mg, oral, 2 times daily    clobetasol (Temovate) 0.05 % external solution Topical, 2 times daily, APPLY A SMALL AMOUNT EXTERNALLY TWICE A DAY FOR SKIN DISEASE OF THE SCALP<BR>    ketoconazole (NIZOral) 2 % shampoo Topical, Daily PRN    metoprolol succinate XL (TOPROL-XL) 50 mg, oral, Nightly, Do not crush or chew.    multivitamin with minerals iron-free (Centrum Silver) 1 tablet, oral, Daily PRN          REVIEW OF SYSTEMS  Review of Systems   All other systems reviewed and are negative.        VITALS  Vitals:    24 1025   BP: 126/58   Pulse: 71       PHYSICAL EXAM  Constitutional:       Appearance: Healthy appearance. Not in distress.   Neck:      Vascular: No JVR. JVD normal.   Pulmonary:      Effort: Pulmonary effort is normal.      Breath sounds: Normal breath sounds. No wheezing. No rhonchi. No rales.   Chest:      Chest wall: Not tender to palpatation.   Cardiovascular:      PMI at left midclavicular line. Normal rate. Regular rhythm. Normal S1. Normal S2.       Murmurs: There is no murmur.      No gallop.  No click. No rub.   Pulses:     Intact distal pulses.   Edema:     Peripheral edema absent.   Abdominal:      General: Bowel sounds are  normal.      Palpations: Abdomen is soft.      Tenderness: There is no abdominal tenderness.   Musculoskeletal: Normal range of motion.         General: No tenderness. Skin:     General: Skin is warm and dry.   Neurological:      General: No focal deficit present.      Mental Status: Alert and oriented to person, place and time.           ASSESSMENT AND PLAN     Clinical impression     1.  History of nonischemic cardiomyopathy with a left ventricular action fraction was low at 35% with recent echocardiogram showing left ventricle action fraction 45%  2.  History of coronary artery disease with mild to moderate disease per cardiac catheterization in 2001  3.  History of hypertension  4.  History of hyperlipidemia  5.  PVCs, frequent by Holter monitor in 2024  6.  Brief episodes of atrial fibrillation with episodes of rapid ventricular response, on beta-blocker therapy  7.  Long-term anticoagulant therapy with Eliquis  8.  Shortness of breath  9.  Dizziness after starting beta-blocker therapy  10.  Congestive heart failure, new [place] Heart Association class II      Plan recommendations    I had a lengthy discussion with patient and family member regarding findings of the event monitor.  So far event monitor shows episodes of atrial fibrillation.  Patient has been asymptomatic.  We discussed the option of rate control versus rhythm control strategy for atrial fibrillation.  Patient states that so far he is doing well and he is asymptomatic.  He was like to continue with beta-blocker therapy for now.    If patient continues having episodes of palpitations or symptoms appear antiarrhythmic therapy will be ordered.    Follow my office in 3 months or sooner needed.    Risk factor modification and lifestyle modification discussed with patient. Diet , exercise and hydration discussed with patient.    I have personally review with patient during this office visit, laboratory data, echocardiogram results, stress test results,  Holter-event monitor results prior and after the last electrophysiology visit. All questions has been answered.    Please excuse any errors in grammar or translation related to this dictation.  Voice recognition software was utilized to prepare this document.      Scribe Attestation  By signing my name below, I, Anali Jiménez MA, Scribe   attest that this documentation has been prepared under the direction and in the presence of José Miguel Peters MD.

## 2024-07-25 ENCOUNTER — TELEMEDICINE (OUTPATIENT)
Dept: PULMONOLOGY | Age: 84
End: 2024-07-25
Payer: MEDICARE

## 2024-07-25 DIAGNOSIS — J44.9 CHRONIC OBSTRUCTIVE PULMONARY DISEASE, UNSPECIFIED COPD TYPE (HCC): Primary | ICD-10-CM

## 2024-07-25 DIAGNOSIS — R06.02 SHORTNESS OF BREATH: ICD-10-CM

## 2024-07-25 DIAGNOSIS — R09.02 HYPOXIA: ICD-10-CM

## 2024-07-25 PROCEDURE — 99443 PR PHYS/QHP TELEPHONE EVALUATION 21-30 MIN: CPT | Performed by: INTERNAL MEDICINE

## 2024-07-25 ASSESSMENT — ENCOUNTER SYMPTOMS
SHORTNESS OF BREATH: 0
CHEST TIGHTNESS: 0
EYE ITCHING: 0
VOICE CHANGE: 0
NAUSEA: 0
DIARRHEA: 0
RHINORRHEA: 0
WHEEZING: 0
VOMITING: 0
COUGH: 0
SORE THROAT: 0
ABDOMINAL PAIN: 0

## 2024-07-25 NOTE — PROGRESS NOTES
Bronchodilator as before. keep  Spo2 90% or above.     This patient encounter is appropriate and reasonable under the circumstances given the patient's particular presentation at this time. The patient has been advised of the potential risks and limitations of this mode of treatment (including but not limited to the absence of in-person examination) and has agreed to be treated in a remote fashion in spite of them. Any and all of the patient's/patient's family's questions on this issue have been answered and I have made no promises or guarantees to the patient. The patient has also been advised to contact this office for worsening conditions or problems, and seek emergency medical treatment and/or call 911 if the patient deems either necessary. The patient stated that they are currently in the state [facility]. If the patient is a minor, permission has been obtained by the parent or guardian for the patient to receive medical care at this visit.     Patient and/or health care decision maker is aware that that he/she may receive a bill for this telephone service, depending on his insurance coverage, and has provided verbal consent to proceed.    This visit was completed via telephone.  Total time 23 minutes.    --Issa Lacy MD

## 2024-08-02 DIAGNOSIS — J22 LOWER RESPIRATORY INFECTION (E.G., BRONCHITIS, PNEUMONIA, PNEUMONITIS, PULMONITIS): ICD-10-CM

## 2024-08-02 DIAGNOSIS — R05.9 COUGH: ICD-10-CM

## 2024-08-02 RX ORDER — BENZONATATE 200 MG/1
200 CAPSULE ORAL 3 TIMES DAILY PRN
Qty: 30 CAPSULE | Refills: 0 | Status: SHIPPED | OUTPATIENT
Start: 2024-08-02 | End: 2024-08-09

## 2024-08-02 RX ORDER — AZITHROMYCIN 250 MG/1
250 TABLET, FILM COATED ORAL SEE ADMIN INSTRUCTIONS
Qty: 6 TABLET | Refills: 0 | Status: SHIPPED | OUTPATIENT
Start: 2024-08-02 | End: 2024-08-07

## 2024-08-02 NOTE — TELEPHONE ENCOUNTER
PTS WIFE CALLED IN TO THE OFFICE BECAUSE HE IS NOT GETTING ANY BETTER.      SHE SAID DURING THE VIRTUAL YOU TOLD THE PT YOU WOULD SEND ANTIBIOTICS.      Rx requested:  Requested Prescriptions     Pending Prescriptions Disp Refills    azithromycin (ZITHROMAX) 250 MG tablet 6 tablet 0     Sig: Take 1 tablet by mouth See Admin Instructions for 5 days 500mg on day 1 followed by 250mg on days 2 - 5    benzonatate (TESSALON) 200 MG capsule 30 capsule 0     Sig: Take 1 capsule by mouth 3 times daily as needed for Cough       Last Office Visit:   7/25/2024      Next Visit Date:  Future Appointments   Date Time Provider Department Center   11/21/2024 10:45 AM Issa Lacy MD [facility]

## 2024-08-12 ENCOUNTER — APPOINTMENT (OUTPATIENT)
Dept: CARDIOLOGY | Facility: CLINIC | Age: 84
End: 2024-08-12
Payer: MEDICARE

## 2024-08-19 ENCOUNTER — LAB (OUTPATIENT)
Dept: LAB | Facility: LAB | Age: 84
End: 2024-08-19
Payer: MEDICARE

## 2024-08-19 ENCOUNTER — APPOINTMENT (OUTPATIENT)
Dept: CARDIOLOGY | Facility: CLINIC | Age: 84
End: 2024-08-19
Payer: MEDICARE

## 2024-08-19 VITALS
DIASTOLIC BLOOD PRESSURE: 82 MMHG | WEIGHT: 187.4 LBS | SYSTOLIC BLOOD PRESSURE: 124 MMHG | BODY MASS INDEX: 27.76 KG/M2 | HEIGHT: 69 IN | HEART RATE: 64 BPM

## 2024-08-19 DIAGNOSIS — I10 HYPERTENSION, ESSENTIAL, BENIGN: ICD-10-CM

## 2024-08-19 DIAGNOSIS — E78.2 MIXED HYPERLIPIDEMIA: ICD-10-CM

## 2024-08-19 DIAGNOSIS — I42.8 NONISCHEMIC CARDIOMYOPATHY (MULTI): Primary | ICD-10-CM

## 2024-08-19 DIAGNOSIS — I25.10 ATHEROSCLEROSIS OF NATIVE CORONARY ARTERY OF NATIVE HEART WITHOUT ANGINA PECTORIS: ICD-10-CM

## 2024-08-19 DIAGNOSIS — I48.19 PERSISTENT ATRIAL FIBRILLATION (MULTI): ICD-10-CM

## 2024-08-19 DIAGNOSIS — I49.3 FREQUENT PVCS: ICD-10-CM

## 2024-08-19 DIAGNOSIS — I48.0 PAF (PAROXYSMAL ATRIAL FIBRILLATION) (MULTI): ICD-10-CM

## 2024-08-19 LAB
ALBUMIN SERPL BCP-MCNC: 4 G/DL (ref 3.4–5)
ALP SERPL-CCNC: 56 U/L (ref 33–136)
ALT SERPL W P-5'-P-CCNC: 16 U/L (ref 10–52)
ANION GAP SERPL CALC-SCNC: 10 MMOL/L (ref 10–20)
AST SERPL W P-5'-P-CCNC: 21 U/L (ref 9–39)
BILIRUB SERPL-MCNC: 0.6 MG/DL (ref 0–1.2)
BUN SERPL-MCNC: 14 MG/DL (ref 6–23)
CALCIUM SERPL-MCNC: 9.3 MG/DL (ref 8.6–10.3)
CHLORIDE SERPL-SCNC: 103 MMOL/L (ref 98–107)
CO2 SERPL-SCNC: 30 MMOL/L (ref 21–32)
CREAT SERPL-MCNC: 1.12 MG/DL (ref 0.5–1.3)
EGFRCR SERPLBLD CKD-EPI 2021: 66 ML/MIN/1.73M*2
ERYTHROCYTE [DISTWIDTH] IN BLOOD BY AUTOMATED COUNT: 14.6 % (ref 11.5–14.5)
GLUCOSE SERPL-MCNC: 94 MG/DL (ref 74–99)
HCT VFR BLD AUTO: 38.9 % (ref 41–52)
HGB BLD-MCNC: 13.2 G/DL (ref 13.5–17.5)
MCH RBC QN AUTO: 30.8 PG (ref 26–34)
MCHC RBC AUTO-ENTMCNC: 33.9 G/DL (ref 32–36)
MCV RBC AUTO: 91 FL (ref 80–100)
NRBC BLD-RTO: 0 /100 WBCS (ref 0–0)
PLATELET # BLD AUTO: 208 X10*3/UL (ref 150–450)
POTASSIUM SERPL-SCNC: 4.5 MMOL/L (ref 3.5–5.3)
PROT SERPL-MCNC: 6.8 G/DL (ref 6.4–8.2)
RBC # BLD AUTO: 4.29 X10*6/UL (ref 4.5–5.9)
SODIUM SERPL-SCNC: 138 MMOL/L (ref 136–145)
WBC # BLD AUTO: 7.6 X10*3/UL (ref 4.4–11.3)

## 2024-08-19 PROCEDURE — 1036F TOBACCO NON-USER: CPT | Performed by: INTERNAL MEDICINE

## 2024-08-19 PROCEDURE — 36415 COLL VENOUS BLD VENIPUNCTURE: CPT

## 2024-08-19 PROCEDURE — 1159F MED LIST DOCD IN RCRD: CPT | Performed by: INTERNAL MEDICINE

## 2024-08-19 PROCEDURE — 80053 COMPREHEN METABOLIC PANEL: CPT

## 2024-08-19 PROCEDURE — 99214 OFFICE O/P EST MOD 30 MIN: CPT | Performed by: INTERNAL MEDICINE

## 2024-08-19 PROCEDURE — 3079F DIAST BP 80-89 MM HG: CPT | Performed by: INTERNAL MEDICINE

## 2024-08-19 PROCEDURE — 85027 COMPLETE CBC AUTOMATED: CPT

## 2024-08-19 PROCEDURE — 3074F SYST BP LT 130 MM HG: CPT | Performed by: INTERNAL MEDICINE

## 2024-08-19 NOTE — PROGRESS NOTES
Patient:  Oscar Chavez  YOB: 1940  MRN: [id number]       HPI:       Oscar Chavez is a 84 y.o. male who returns today for cardiac follow-up.     He is accompanied by his wife. He has a history of nonischemic dilated cardiomyopathy. His ejection fractions were as low as 30% with most recent studies showing an LVEF of 55-60%. Remote cardiac catheterization in 2001 revealed 40-60% mid LAD with 10-20% RCA and luminal irregularities of circumflex EF was 30-35% at that time. A CT angiogram in 2009 showed 50% LAD stenosis with otherwise minimal disease. He has essential hypertension and hyperlipidemia. In December 2016 he underwent resection of an intercerebral aneurysm after presenting with an episode of severe vertigo per Dr. Rodger Sarmiento at [facility]. His postop course was complicated by a subdural hematoma for which he underwent redo craniotomy and drainage. He had initial expressive aphasia which normalized.      A Lexiscan myocardial perfusion study on June 8, 2021 was negative for ischemia or infarction.  The study was done for evaluation of shortness of breath.  There was some diaphragmatic attenuation artifact. Calculated LV ejection fraction was 56%. He underwent PFTs and was noted to have mild obstructive disease.  He was seen by Dr. Johns. He was placed on nocturnal oxygen and given albuterol. He states his symptoms have improved.      At the time of a previous visit he was noted to have some irregularities of his heart rhythm.  EKG in the office showed normal sinus rhythm with PVCs versus PACs with aberrant conduction.  He was started back on for Toprol-XL 25 mg daily.  He was also advised to start back on simvastatin.  An echocardiogram on December 18, 2023 showed estimated LV ejection fraction of 55 to 60%.  There was mild (1+) mitral and tricuspid regurgitation with estimated RVSP 21 mmHg.  The ascending aorta measured 3.8 cm.  A 24-hour Holter monitor on December 18, 2023  showed normal sinus rhythm with PVCs comprising approximately 10% of the overall rhythm.  There were periods of ventricular bigeminy.  There was one 9 beat run of atrial tachycardia at 154 bpm.  He was reported to be in atrial fibrillation approximately 4% of the time.  Review of the rhythm strips shows that the majority of time in atrial fibrillation was reported there were P waves consistent with sinus rhythm or atrial tachycardia.  He did have a few relatively brief episodes of atrial fibrillation however.  A cardiac MRI on February 5, 2014 showed a calculated LV ejection fraction 45%.  The basal anterior septum measured 14 mm.  Delayed enhancement revealed transmural myocardial infarction of the basal mid to distal inferolateral wall.  There was transmural myocardial infarction of the basal mid anterolateral wall.  Calculated RV ejection fraction was 56%.  No evidence of ARVC.    A 30-day event monitor April 11, 2024 showed normal sinus rhythm with periods of sinus bradycardia.  There were 4 patient triggered events correlating with normal sinus rhythm, sinus bradycardia, and some PVCs.  There were 4 triggered events showing brief runs of atrial fibrillation.       He has been doing well since his last visit.  He has been more attentive to his diet more active.  He is lost approximately 17 pounds.  He remains reasonably active without limitations.   He denies any chest discomfort. He denies any orthopnea, PND, or increasing peripheral edema. He denies any palpitations, lightheadedness, near-syncope, or syncope. He denies any fever, chills, or cough. He denies any nausea, vomiting, or diaphoresis. He denies any hemoptysis, hematemesis, melena, or hematochezia. His blood pressures are well controlled.  He is currently free of any anginal or CHF symptoms.  He will be continued on the same medications.  Will obtain a follow-up CBC and CMP today.  Other details as noted below.     The above portion of this note was  dictated by me using voice recognition software. I personally performed the services described in the documentation. The scribe entering the documentation below was in my presence. I affirm that the information is both accurate and complete.      Objective:     Vitals:    08/19/24 1435   BP: 124/82   Pulse: 64       Wt Readings from Last 4 Encounters:   08/19/24 85 kg (187 lb 6.4 oz)   06/20/24 84.9 kg (187 lb 3.2 oz)   03/29/24 88.3 kg (194 lb 9.6 oz)   02/12/24 88.3 kg (194 lb 9.6 oz)       Allergies:     No Known Allergies       Medications:     Current Outpatient Medications   Medication Instructions    albuterol 90 mcg/actuation inhaler 2 puffs, inhalation, Every 4 hours PRN    apixaban (ELIQUIS) 5 mg, oral, 2 times daily    clobetasol (Temovate) 0.05 % external solution Topical, 2 times daily, APPLY A SMALL AMOUNT EXTERNALLY TWICE A DAY FOR SKIN DISEASE OF THE SCALP<BR>    ketoconazole (NIZOral) 2 % shampoo Topical, Daily PRN    metoprolol succinate XL (TOPROL-XL) 50 mg, oral, Nightly    multivitamin with minerals iron-free (Centrum Silver) 1 tablet, oral, Daily PRN       Physical Examination:   GENERAL:  Well developed, well nourished, in no acute distress.  CHEST:  Symmetric and nontender.  NEURO/PSYCH:  Alert and oriented times three with approppriate behavior and responses.  NECK:  Supple, no JVD, no bruit.  LUNGS:  Clear to auscultation bilaterally, normal respiratory effort.  HEART:  Rate and rhythm regular with no evident murmur, no gallop appreciated.        There are no rubs, clicks or heaves.  EXTREMITIES:  Warm with good color, no clubbing or cyanosis.  There is no edema noted.  PERIPHERAL VASCULAR:  Pulses present and equally palpable; 2+ throughout.      Lab:     CBC:   Lab Results   Component Value Date    WBC 9.2 12/14/2021    RBC 4.59 12/14/2021    HGB 14.3 12/14/2021    HCT 43.2 12/14/2021     12/14/2021        CMP:    Lab Results   Component Value Date     12/14/2021    K 4.0  12/14/2021     12/14/2021    CO2 28 12/14/2021    BUN 17 12/14/2021    CREATININE 1.21 12/14/2021    GLUCOSE 105 (H) 12/14/2021    CALCIUM 9.7 12/14/2021       Lipid Profile:    Lab Results   Component Value Date    TRIG 280 (H) 12/14/2021    HDL 53.0 12/14/2021       BMP:  Lab Results   Component Value Date     12/14/2021     06/09/2020     11/05/2019    K 4.0 12/14/2021    K 4.2 06/09/2020    K 4.0 11/05/2019     12/14/2021     06/09/2020     11/05/2019    CO2 28 12/14/2021    CO2 28 06/09/2020    CO2 28 11/05/2019    BUN 17 12/14/2021    BUN 20 06/09/2020    BUN 18 11/05/2019    CREATININE 1.21 12/14/2021    CREATININE 1.12 06/09/2020    CREATININE 1.14 11/05/2019       CBC:  Lab Results   Component Value Date    WBC 9.2 12/14/2021    RBC 4.59 12/14/2021    HGB 14.3 12/14/2021    HCT 43.2 12/14/2021    MCV 94 12/14/2021    MCHC 33.1 12/14/2021    RDW 14.2 12/14/2021     12/14/2021       Hepatic Function Panel:    Lab Results   Component Value Date    ALKPHOS 54 12/14/2021    ALT 24 12/14/2021    AST 22 12/14/2021    PROT 7.1 12/14/2021    BILITOT 0.6 12/14/2021         Diagnostic Studies:     MR cardiac morphology and function w and wo IV contrast  Result Date: 2/6/2024  Interpreted By:  Fabrizio Julian, STUDY: MR CARDIAC RESONANCE IMAGING FOR VELOCITY FLOW MAPPING; MR CARDIAC MORPHOLOGY AND FUNCTION W AND WO IV CONTRAST;  2/5/2024 1:42 pm   INDICATION: Signs/Symptoms:VTach; Signs/Symptoms:-.   This study is performed to assess myocardial viability and damage, and to quantitate left ventricular and valvular function.     Interpreted By:  Fabrizio Julian    1.  Normal left ventricular cavity size with mild-moderately depressed systolic function. Ejection fraction 45%. 2.   Asymmetric septal left ventricular hypertrophy. Basal anterior septum 1.4 cm. 3.  Delayed enhancement imaging reveals transmural myocardial infarction of the basal/mid/distal inferolateral wall.  Subendocardial non transmural myocardial infarction of the basal/mid anterolateral wall. 4.  Normal right ventricular cavity size with preserved systolic function. RV EF 56%. No CMR evidence of ARVC.     MACRO: None   Signed by: Fabrizio Julian 2/6/2024 10:29 AM Dictation workstation:   [id number]      === 06/08/21 ===    - Impression -  Borderline abnormal Lexiscan Myoview cardiac perfusion stress test.  No evidence of myocardial ischemia by perfusion imaging.  Small fixed defect posterolateral wall which may well be diaphragm  attenuation in that there is some wall thickening in this region.  Normal left ventricular systolic function.  Left ventricular ejection fraction 56 %.  Overall low risk pattern stress test. Although initial exercise did  not achieve 85% target heart rate he did achieve 7 M ET of exercise  which is excellent for his age and gender. This was not associated  with EKG changes or angina. Lexiscan showed no inducible ischemia  with preserved LV function. There is a question of posterolateral  infarct versus soft tissue attenuation but this involves throughout  small portion of the myocardium..      Problem List:     Patient Active Problem List   Diagnosis    Acute bronchitis    Atherosclerosis of native coronary artery without angina pectoris    Chronic obstructive pulmonary disease (Multi)    Elevated blood sugar    Esophageal reflux    Fatigue    Hyperlipidemia    Hypertension, essential, benign    Intracranial aneurysm (HHS-HCC)    Nonischemic cardiomyopathy (Multi)    SOB (shortness of breath) on exertion    Subdural hematoma (Multi)    Dizzinesses    Vertigo    Frequent PVCs    NSVT (nonsustained ventricular tachycardia) (Multi)    PAF (paroxysmal atrial fibrillation) (Multi)       Asessment:     Problem List Items Addressed This Visit             ICD-10-CM    Atherosclerosis of native coronary artery without angina pectoris I25.10    Hyperlipidemia E78.5    Hypertension, essential, benign  I10    Nonischemic cardiomyopathy (Multi) - Primary I42.8    Frequent PVCs I49.3    Relevant Orders    Follow Up In Cardiology    CBC    Comprehensive Metabolic Panel    CBC    Comprehensive Metabolic Panel    PAF (paroxysmal atrial fibrillation) (Multi) I48.0     Other Visit Diagnoses         Codes    Persistent atrial fibrillation (Multi)     I48.19    Relevant Orders    Follow Up In Cardiology    CBC    Comprehensive Metabolic Panel    CBC    Comprehensive Metabolic Panel

## 2024-08-19 NOTE — PATIENT INSTRUCTIONS
LABS NOW AND PRIOR TO NEXT OFFICE VISIT.     Exercise diet weight loss program.     Stay plenty HYDRATED     Use My Chart portal for reviewing records, testing and contacting office.      Please bring all medicines, vitamins, and herbal supplements with you in original bottles to every appointment!!!!    Prescriptions will not be filled unless you are compliant with your follow up appointments or have a follow up appointment scheduled as per instruction of your physician. Refills should be requested at the time of your visit.    show

## 2024-08-22 ENCOUNTER — TELEPHONE (OUTPATIENT)
Dept: CARDIOLOGY | Facility: CLINIC | Age: 84
End: 2024-08-22
Payer: MEDICARE

## 2024-08-22 NOTE — TELEPHONE ENCOUNTER
----- Message from Tyler Drake sent at 8/21/2024  5:04 PM EDT -----  Labs reviewed and are normal.  Continue same and follow-up as scheduled.  Thanks.

## 2024-08-22 NOTE — TELEPHONE ENCOUNTER
Called and spoke with spouse Catherine as patient was still in bed.   Advised of results and instructed to continue current plan and follow up.  States understanding.

## 2024-10-03 ENCOUNTER — LAB (OUTPATIENT)
Dept: LAB | Facility: LAB | Age: 84
End: 2024-10-03
Payer: MEDICARE

## 2024-10-03 ENCOUNTER — APPOINTMENT (OUTPATIENT)
Dept: CARDIOLOGY | Facility: CLINIC | Age: 84
End: 2024-10-03
Payer: MEDICARE

## 2024-10-03 VITALS
HEART RATE: 56 BPM | DIASTOLIC BLOOD PRESSURE: 72 MMHG | SYSTOLIC BLOOD PRESSURE: 122 MMHG | HEIGHT: 69 IN | BODY MASS INDEX: 27.93 KG/M2 | WEIGHT: 188.6 LBS

## 2024-10-03 DIAGNOSIS — I10 HYPERTENSION, ESSENTIAL, BENIGN: ICD-10-CM

## 2024-10-03 DIAGNOSIS — I48.19 PERSISTENT ATRIAL FIBRILLATION (MULTI): ICD-10-CM

## 2024-10-03 DIAGNOSIS — I42.8 NONISCHEMIC CARDIOMYOPATHY (MULTI): ICD-10-CM

## 2024-10-03 DIAGNOSIS — I48.0 PAROXYSMAL ATRIAL FIBRILLATION (MULTI): ICD-10-CM

## 2024-10-03 DIAGNOSIS — I49.3 PVC (PREMATURE VENTRICULAR CONTRACTION): ICD-10-CM

## 2024-10-03 DIAGNOSIS — I49.3 FREQUENT PVCS: Primary | ICD-10-CM

## 2024-10-03 DIAGNOSIS — Z87.891 FORMER SMOKER: ICD-10-CM

## 2024-10-03 LAB
ANION GAP SERPL CALC-SCNC: 10 MMOL/L (ref 10–20)
BUN SERPL-MCNC: 21 MG/DL (ref 6–23)
CALCIUM SERPL-MCNC: 9.7 MG/DL (ref 8.6–10.3)
CHLORIDE SERPL-SCNC: 102 MMOL/L (ref 98–107)
CO2 SERPL-SCNC: 30 MMOL/L (ref 21–32)
CREAT SERPL-MCNC: 1.13 MG/DL (ref 0.5–1.3)
EGFRCR SERPLBLD CKD-EPI 2021: 65 ML/MIN/1.73M*2
GLUCOSE SERPL-MCNC: 105 MG/DL (ref 74–99)
INR PPP: 1.1 (ref 0.9–1.1)
POTASSIUM SERPL-SCNC: 4.5 MMOL/L (ref 3.5–5.3)
PROTHROMBIN TIME: 12.5 SECONDS (ref 9.8–12.8)
SODIUM SERPL-SCNC: 137 MMOL/L (ref 136–145)

## 2024-10-03 PROCEDURE — 3078F DIAST BP <80 MM HG: CPT | Performed by: INTERNAL MEDICINE

## 2024-10-03 PROCEDURE — 1036F TOBACCO NON-USER: CPT | Performed by: INTERNAL MEDICINE

## 2024-10-03 PROCEDURE — 80048 BASIC METABOLIC PNL TOTAL CA: CPT

## 2024-10-03 PROCEDURE — 1159F MED LIST DOCD IN RCRD: CPT | Performed by: INTERNAL MEDICINE

## 2024-10-03 PROCEDURE — 99215 OFFICE O/P EST HI 40 MIN: CPT | Performed by: INTERNAL MEDICINE

## 2024-10-03 PROCEDURE — 36415 COLL VENOUS BLD VENIPUNCTURE: CPT

## 2024-10-03 PROCEDURE — 3074F SYST BP LT 130 MM HG: CPT | Performed by: INTERNAL MEDICINE

## 2024-10-03 PROCEDURE — 85610 PROTHROMBIN TIME: CPT

## 2024-10-03 RX ORDER — MUPIROCIN 20 MG/G
1 OINTMENT TOPICAL ONCE
OUTPATIENT
Start: 2024-10-03 | End: 2024-10-03

## 2024-10-03 RX ORDER — CHLORHEXIDINE GLUCONATE 40 MG/ML
SOLUTION TOPICAL ONCE
OUTPATIENT
Start: 2024-10-03 | End: 2024-10-03

## 2024-10-03 NOTE — PROGRESS NOTES
CARDIOLOGY OFFICE VISIT      CHIEF COMPLAINT  Chief Complaint   Patient presents with    Follow-up     3 month       HISTORY OF PRESENT ILLNESS  HPI  84-year-old male with a past medical history of ischemic dilated cardiomyopathy with ejection fraction was low at 30% with echocardiogram in June 2018 showed left ventricular atrial fraction 55 to 60% an echocardiogram in 2023 shows left ventricular atrial fraction of 55 to 60%.  Remote cardiac catheterization in 2001 that revealed 40 to 60% mid LAD with 10 to 20% right coronary artery luminal irregularities of the circumflex with left ventricular ejection fraction of 30 to 35%.  CT angiogram in 2009 showed 50% stenosis of the LAD otherwise minimal disease.  History of hypertension, hyperlipidemia.     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  Stress test in June 2021 shows no evidence of ischemia with left ventricular action fraction 56%.  PFTs show mild obstructive disease.  A Holter monitor was ordered due to some irregular heartbeats.  Holter monitor showed underlying rhythm was sinus rhythm with occasional PVCs comprising approximately 10% of the total beats.  The morphology of the PVCs were polymorphic.  Also there were frequent brief episodes of nonsustained supraventricular tachycardia and some other rhythm strips are consistent actually with atrial fibrillation.  [place] of atrial fibrillation approximately 4% of the time.     A cardiac MRI was ordered in February 2014 that shows left ventricular ejection fraction 45% with basal anterior septum measuring 14 mm.  Delaying has been revealed transmural myocardial infarction in the basal mid to distal inferolateral wall and there was a transmural myocardial infarction of the basal mid anterolateral wall with right ventricular ejection fraction of 56%.        Echocardiogram 2023     CONCLUSIONS:   1. Left ventricular systolic function is normal  with a 55-60% estimated ejection fraction.   2. Spectral Doppler shows an impaired relaxation pattern of left ventricular diastolic filling.   3. Mild (1+) mitral regurgitation.   4. Mild (1+) tricuspid regurgitation with estimated RVSP of 21 mmHg.   5. The ascending aorta measures 3.8 cm.   6. No previous available for comparison.        Holter monitor December 2023     Total of 8041 ventricular ectopic beats, comprising 9.8% of the total QRS complexes.  95 ventricular couplets  Several episodes of ventricular bigeminy  Supraventricular premature beats comprise 1.5% of total QRS complexes  Atrial runs, longest 9 beats at 5:59 PM fastest 154 beats at 2:11 AM.  Longest R to R interval 1.9 seconds  Patient was in atrial fibrillation 4.2% of the monitoring interval.  Ventricular rate was controlled in atrial fibrillation.  There was significant baseline artifact, and several strips were erased.     Summary:  1.  This is an abnormal Holter monitor  2.  Ventricular ectopy comprised 9.8% of the total QRS complexes  3.  Atrial fibrillation was noted, with controlled ventricular rate  4.  There were several runs of atrial tachycardia, short, no symptoms were reported, maximum ventricular rate 154 bpm at 2:11 AM.  5.  Left atrial abnormality and first-degree AV block are noted  6.  QRS appears widened.        MR cardiac morphology and function w and wo IV contrast  Result Date: 2/6/2024     1.  Normal left ventricular cavity size with mild-moderately depressed systolic function. Ejection fraction 45%. 2.   Asymmetric septal left ventricular hypertrophy. Basal anterior septum 1.4 cm. 3.  Delayed enhancement imaging reveals transmural myocardial infarction of the basal/mid/distal inferolateral wall. Subendocardial non transmural myocardial infarction of the basal/mid anterolateral wall. 4.  Normal right ventricular cavity size with preserved systolic function. RV EF 56%. No CMR evidence of ARVC.     MACRO: None   Signed by:  Fabrizio Julian 2/6/2024 10:29 AM         === 06/08/21 ===  - Impression -  Borderline abnormal Lexiscan Myoview cardiac perfusion stress test.  No evidence of myocardial ischemia by perfusion imaging.  Small fixed defect posterolateral wall which may well be diaphragm  attenuation in that there is some wall thickening in this region.  Normal left ventricular systolic function.  Left ventricular ejection fraction 56 %.  Overall low risk pattern stress test. Although initial exercise did  not achieve 85% target heart rate he did achieve 7 M ET of exercise  which is excellent for his age and gender. This was not associated  with EKG changes or angina. Lexiscan showed no inducible ischemia  with preserved LV function. There is a question of posterolateral  infarct versus soft tissue attenuation but this involves throughout  small portion of the myocardium..     Patient was placed on Eliquis therapy .    Event monitor April 2024  This is an event monitor for 30 days from April 11, 2024 to May 11, 2024.     The underlying rhythm was sinus bradycardia rate of 49 bpm.     There were 4 patient triggered events and 8 auto triggered events.  All these events had no symptoms related.  All these events correlated with sinus rhythm-sinus bradycardia with PVCs.     There were 4 events related with atrial fibrillation.  The first 1 April 14, 2024 at 1:33 AM.  Rhythm strip was consistent with sinus bradycardia with atrial fibrillation at a rate of 56 bpm.     The second episode was April 23, 2024 at 12:38 AM.  Sinus bradycardia with atrial fibrillation rate 56 bpm.     The third episode was April 26, 2024 at 5:51 AM.  He was related with sinus bradycardia with atrial fibrillation rates for 48 bpm.     Therefore event was May 7, 2024 at 9:50 AM.  He was not auto trigger event.  Rhythm strip consistent with sinus bradycardia with atrial fibrillation rate of 52 bpm.  Clinical correlation is to be made    During the last office visit,  we discussed the option of using antiarrhythmic therapy for better control of his arrhythmias.  He states that he was not too symptomatic he wanted to be left in observation.    Patient states that since the last office visit he had 2 episodes of near syncope.  Only for few seconds of duration.  Also he is not aware of atrial fibrillation.    Patient states also at the end of this office visit that he has been using Eliquis most of the times once a day.        Past Medical History  History reviewed. No pertinent past medical history.    Social History  Social History     Tobacco Use    Smoking status: Former     Current packs/day: 0.00     Types: Cigarettes     Quit date:      Years since quittin.7    Smokeless tobacco: Never   Substance Use Topics    Alcohol use: Not Currently    Drug use: Not Currently       Family History   No family history on file.     Allergies:  No Known Allergies     Outpatient Medications:  Current Outpatient Medications   Medication Instructions    albuterol 90 mcg/actuation inhaler 2 puffs, inhalation, Every 4 hours PRN    apixaban (ELIQUIS) 5 mg, oral, 2 times daily    clobetasol (Temovate) 0.05 % external solution Topical, 2 times daily, APPLY A SMALL AMOUNT EXTERNALLY TWICE A DAY FOR SKIN DISEASE OF THE SCALP<BR>    ketoconazole (NIZOral) 2 % shampoo Topical, Daily PRN    metoprolol succinate XL (TOPROL-XL) 50 mg, oral, Nightly          REVIEW OF SYSTEMS  Review of Systems   All other systems reviewed and are negative.        VITALS  Vitals:    10/03/24 1057   BP: 122/72   Pulse: 56       PHYSICAL EXAM  Constitutional:       Appearance: Healthy appearance. Not in distress.   Neck:      Vascular: No JVR. JVD normal.   Pulmonary:      Effort: Pulmonary effort is normal.      Breath sounds: Normal breath sounds. No wheezing. No rhonchi. No rales.   Chest:      Chest wall: Not tender to palpatation.   Cardiovascular:      PMI at left midclavicular line. Normal rate. Regular  rhythm. Normal S1. Normal S2.       Murmurs: There is no murmur.      No gallop.  No click. No rub.   Pulses:     Intact distal pulses.   Edema:     Peripheral edema absent.   Abdominal:      General: Bowel sounds are normal.      Palpations: Abdomen is soft.      Tenderness: There is no abdominal tenderness.   Musculoskeletal: Normal range of motion.         General: No tenderness. Skin:     General: Skin is warm and dry.   Neurological:      General: No focal deficit present.      Mental Status: Alert and oriented to person, place and time.           ASSESSMENT AND PLAN  Clinical impression     1.  History of nonischemic cardiomyopathy with a left ventricular ejection fraction was low at 35% with recent echocardiogram showing left ventricle ejection fraction 45%  2.  History of coronary artery disease with mild to moderate disease per cardiac catheterization in 2001  3.  History of hypertension  4.  History of hyperlipidemia  5.  PVCs, frequent by Holter monitor in 2024  6.  Brief episodes of atrial fibrillation with episodes of rapid ventricular response, on beta-blocker therapy  7.  Long-term anticoagulant therapy with Eliquis  8.  Shortness of breath  9.  Dizziness after starting beta-blocker therapy  10.  Congestive heart failure, new [place] Heart Association class II  11.  Near syncopal episodes      Plan-recommendations    Patient is to take Eliquis twice a day.  He was reinforced during this office visit.    Patient had also near syncopal episodes.  He will benefit of a loop recorder implantation.  Patient is to have a close monitoring of burden of atrial fibrillation and possibility of bradycardia.    Procedure, risk, benefits and possible complications were explained to patient.  All questions were answered.  Patient agrees with plan.  Informed consent was signed.    Hold Eliquis 48 hours prior to procedure.    Follow my office 7 days postprocedure for wound assessment.    Risk factor modification and  lifestyle modification discussed with patient. Diet , exercise and hydration discussed with patient.    I have personally review with patient during this office visit, laboratory data, echocardiogram results, stress test results, Holter-event monitor results prior and after the last electrophysiology visit. All questions has been answered.    Please excuse any errors in grammar or translation related to this dictation.  Voice recognition software was utilized to prepare this document.      Scribe Attestation  By signing my name below, I, Ximena Gomez LPN , Scribe   attest that this documentation has been prepared under the direction and in the presence of José Miguel Peters MD.

## 2024-10-03 NOTE — PATIENT INSTRUCTIONS
Dr. Peters would like you to have a implantable loop recorder placed . This will keep track of any arrhythmias you may be having and let us know what kind of arrhythmia.    Fasting labs to be done prior to loop implant.   Hold Eliquis 48 hours prior to loop    Follow up 1 week post op for wound check        DID YOU KNOW  We have a pharmacy here in the [facility].  They can fill all prescriptions, not just cardiac medications.  Prescriptions from other pharmacies can easily be transferred to the  pharmacy by the  pharmacist on site.   pharmacies offer FREE HOME DELIVERY on medications to anywhere in [place]. They can sync your medications. Typically prescriptions can be ready in 10 - 15 minutes. If pharmacy is unable to fill your  prescription or if cost is more than your paying now the Pharmacist can easily transfer back to your Pharmacy of choice. Pharmacy phone # [phone].       Please bring all medicines, vitamins, and herbal supplements with you in original bottles to every appointment!!!!    Prescriptions will not be filled unless you are compliant with your follow up appointments or have a follow up appointment scheduled as per instruction of your physician. Refills should be requested at the time of your visit.

## 2024-10-17 ENCOUNTER — APPOINTMENT (OUTPATIENT)
Dept: CARDIOLOGY | Facility: CLINIC | Age: 84
End: 2024-10-17
Payer: MEDICARE

## 2024-10-24 ENCOUNTER — PREP FOR PROCEDURE (OUTPATIENT)
Dept: CARDIOLOGY | Facility: CLINIC | Age: 84
End: 2024-10-24
Payer: MEDICARE

## 2024-10-24 DIAGNOSIS — R55 NEAR SYNCOPE: Primary | ICD-10-CM

## 2024-10-24 DIAGNOSIS — I49.3 PVC (PREMATURE VENTRICULAR CONTRACTION): ICD-10-CM

## 2024-10-24 DIAGNOSIS — I48.91 ATRIAL FIBRILLATION, UNSPECIFIED TYPE (MULTI): ICD-10-CM

## 2024-10-24 RX ORDER — CHLORHEXIDINE GLUCONATE 40 MG/ML
SOLUTION TOPICAL ONCE
Status: CANCELLED | OUTPATIENT
Start: 2024-10-24 | End: 2024-10-24

## 2024-10-24 RX ORDER — MUPIROCIN 20 MG/G
1 OINTMENT TOPICAL ONCE
Status: CANCELLED | OUTPATIENT
Start: 2024-10-24 | End: 2024-10-24

## 2024-10-24 RX ORDER — CEFAZOLIN SODIUM 2 G/100ML
2 INJECTION, SOLUTION INTRAVENOUS ONCE
Status: CANCELLED | OUTPATIENT
Start: 2024-10-24 | End: 2024-10-24

## 2024-10-28 ENCOUNTER — LAB (OUTPATIENT)
Dept: LAB | Facility: LAB | Age: 84
End: 2024-10-28
Payer: MEDICARE

## 2024-10-28 DIAGNOSIS — R55 NEAR SYNCOPE: ICD-10-CM

## 2024-10-28 DIAGNOSIS — I48.91 ATRIAL FIBRILLATION, UNSPECIFIED TYPE (MULTI): ICD-10-CM

## 2024-10-28 DIAGNOSIS — I49.3 PVC (PREMATURE VENTRICULAR CONTRACTION): ICD-10-CM

## 2024-10-28 LAB
INR PPP: 1 (ref 0.9–1.1)
PROTHROMBIN TIME: 10.9 SECONDS (ref 9.8–12.8)

## 2024-10-28 PROCEDURE — 85610 PROTHROMBIN TIME: CPT

## 2024-10-28 PROCEDURE — 36415 COLL VENOUS BLD VENIPUNCTURE: CPT

## 2024-10-29 ENCOUNTER — HOSPITAL ENCOUNTER (OUTPATIENT)
Facility: HOSPITAL | Age: 84
Setting detail: OUTPATIENT SURGERY
Discharge: HOME | End: 2024-10-29
Attending: INTERNAL MEDICINE | Admitting: INTERNAL MEDICINE
Payer: MEDICARE

## 2024-10-29 ENCOUNTER — APPOINTMENT (OUTPATIENT)
Dept: CARDIOLOGY | Facility: HOSPITAL | Age: 84
End: 2024-10-29
Payer: MEDICARE

## 2024-10-29 VITALS
HEART RATE: 60 BPM | SYSTOLIC BLOOD PRESSURE: 138 MMHG | DIASTOLIC BLOOD PRESSURE: 73 MMHG | RESPIRATION RATE: 18 BRPM | BODY MASS INDEX: 27.55 KG/M2 | HEIGHT: 70 IN | TEMPERATURE: 97.2 F | WEIGHT: 192.46 LBS

## 2024-10-29 DIAGNOSIS — I48.19 PERSISTENT ATRIAL FIBRILLATION (MULTI): ICD-10-CM

## 2024-10-29 DIAGNOSIS — R55 NEAR SYNCOPE: Primary | ICD-10-CM

## 2024-10-29 DIAGNOSIS — R55 SYNCOPE AND COLLAPSE: ICD-10-CM

## 2024-10-29 DIAGNOSIS — Z95.818 STATUS POST PLACEMENT OF IMPLANTABLE LOOP RECORDER: ICD-10-CM

## 2024-10-29 DIAGNOSIS — I48.91 ATRIAL FIBRILLATION, UNSPECIFIED TYPE (MULTI): ICD-10-CM

## 2024-10-29 DIAGNOSIS — Z95.818 STATUS POST PLACEMENT OF IMPLANTABLE LOOP RECORDER: Primary | ICD-10-CM

## 2024-10-29 DIAGNOSIS — I49.3 PVC (PREMATURE VENTRICULAR CONTRACTION): ICD-10-CM

## 2024-10-29 LAB
ANION GAP SERPL CALC-SCNC: 12 MMOL/L (ref 10–20)
ATRIAL RATE: 57 BPM
BUN SERPL-MCNC: 17 MG/DL (ref 6–23)
CALCIUM SERPL-MCNC: 8.8 MG/DL (ref 8.6–10.3)
CHLORIDE SERPL-SCNC: 103 MMOL/L (ref 98–107)
CO2 SERPL-SCNC: 24 MMOL/L (ref 21–32)
CREAT SERPL-MCNC: 1.05 MG/DL (ref 0.5–1.3)
EGFRCR SERPLBLD CKD-EPI 2021: 71 ML/MIN/1.73M*2
ERYTHROCYTE [DISTWIDTH] IN BLOOD BY AUTOMATED COUNT: 13.9 % (ref 11.5–14.5)
GLUCOSE SERPL-MCNC: 91 MG/DL (ref 74–99)
HCT VFR BLD AUTO: 35.8 % (ref 41–52)
HGB BLD-MCNC: 12.2 G/DL (ref 13.5–17.5)
MCH RBC QN AUTO: 30.8 PG (ref 26–34)
MCHC RBC AUTO-ENTMCNC: 34.1 G/DL (ref 32–36)
MCV RBC AUTO: 90 FL (ref 80–100)
NRBC BLD-RTO: 0 /100 WBCS (ref 0–0)
P AXIS: 36 DEGREES
P OFFSET: 185 MS
P ONSET: 120 MS
PLATELET # BLD AUTO: 200 X10*3/UL (ref 150–450)
POTASSIUM SERPL-SCNC: 3.8 MMOL/L (ref 3.5–5.3)
PR INTERVAL: 204 MS
Q ONSET: 222 MS
QRS COUNT: 9 BEATS
QRS DURATION: 108 MS
QT INTERVAL: 436 MS
QTC CALCULATION(BAZETT): 424 MS
QTC FREDERICIA: 428 MS
R AXIS: -17 DEGREES
RBC # BLD AUTO: 3.96 X10*6/UL (ref 4.5–5.9)
SODIUM SERPL-SCNC: 135 MMOL/L (ref 136–145)
T AXIS: 30 DEGREES
T OFFSET: 440 MS
VENTRICULAR RATE: 57 BPM
WBC # BLD AUTO: 8.1 X10*3/UL (ref 4.4–11.3)

## 2024-10-29 PROCEDURE — 93010 ELECTROCARDIOGRAM REPORT: CPT | Performed by: INTERNAL MEDICINE

## 2024-10-29 PROCEDURE — 2500000004 HC RX 250 GENERAL PHARMACY W/ HCPCS (ALT 636 FOR OP/ED): Performed by: NURSE PRACTITIONER

## 2024-10-29 PROCEDURE — 7100000001 HC RECOVERY ROOM TIME - INITIAL BASE CHARGE: Performed by: INTERNAL MEDICINE

## 2024-10-29 PROCEDURE — 85027 COMPLETE CBC AUTOMATED: CPT | Performed by: NURSE PRACTITIONER

## 2024-10-29 PROCEDURE — 2720000007 HC OR 272 NO HCPCS: Performed by: INTERNAL MEDICINE

## 2024-10-29 PROCEDURE — 33285 INSJ SUBQ CAR RHYTHM MNTR: CPT | Performed by: INTERNAL MEDICINE

## 2024-10-29 PROCEDURE — 93005 ELECTROCARDIOGRAM TRACING: CPT | Mod: 59

## 2024-10-29 PROCEDURE — 2500000001 HC RX 250 WO HCPCS SELF ADMINISTERED DRUGS (ALT 637 FOR MEDICARE OP): Performed by: NURSE PRACTITIONER

## 2024-10-29 PROCEDURE — 36415 COLL VENOUS BLD VENIPUNCTURE: CPT | Performed by: NURSE PRACTITIONER

## 2024-10-29 PROCEDURE — C1764 EVENT RECORDER, CARDIAC: HCPCS | Performed by: INTERNAL MEDICINE

## 2024-10-29 PROCEDURE — 2780000003 HC OR 278 NO HCPCS: Performed by: INTERNAL MEDICINE

## 2024-10-29 PROCEDURE — 2500000004 HC RX 250 GENERAL PHARMACY W/ HCPCS (ALT 636 FOR OP/ED): Performed by: INTERNAL MEDICINE

## 2024-10-29 PROCEDURE — 80048 BASIC METABOLIC PNL TOTAL CA: CPT | Performed by: NURSE PRACTITIONER

## 2024-10-29 PROCEDURE — 7100000002 HC RECOVERY ROOM TIME - EACH INCREMENTAL 1 MINUTE: Performed by: INTERNAL MEDICINE

## 2024-10-29 DEVICE — ICM LNQ22 LINQ II USA
Type: IMPLANTABLE DEVICE | Site: CHEST | Status: FUNCTIONAL
Brand: LINQ II™

## 2024-10-29 RX ORDER — ONDANSETRON 4 MG/1
4 TABLET, FILM COATED ORAL EVERY 8 HOURS PRN
Status: DISCONTINUED | OUTPATIENT
Start: 2024-10-29 | End: 2024-10-29 | Stop reason: HOSPADM

## 2024-10-29 RX ORDER — ACETAMINOPHEN 325 MG/1
650 TABLET ORAL EVERY 4 HOURS PRN
Status: DISCONTINUED | OUTPATIENT
Start: 2024-10-29 | End: 2024-10-29 | Stop reason: HOSPADM

## 2024-10-29 RX ORDER — ACETAMINOPHEN 650 MG/1
650 SUPPOSITORY RECTAL EVERY 4 HOURS PRN
Status: DISCONTINUED | OUTPATIENT
Start: 2024-10-29 | End: 2024-10-29 | Stop reason: HOSPADM

## 2024-10-29 RX ORDER — CHLORHEXIDINE GLUCONATE 40 MG/ML
SOLUTION TOPICAL ONCE
Status: COMPLETED | OUTPATIENT
Start: 2024-10-29 | End: 2024-10-29

## 2024-10-29 RX ORDER — ONDANSETRON HYDROCHLORIDE 2 MG/ML
4 INJECTION, SOLUTION INTRAVENOUS EVERY 8 HOURS PRN
Status: DISCONTINUED | OUTPATIENT
Start: 2024-10-29 | End: 2024-10-29 | Stop reason: HOSPADM

## 2024-10-29 RX ORDER — ACETAMINOPHEN 160 MG/5ML
650 SOLUTION ORAL EVERY 4 HOURS PRN
Status: DISCONTINUED | OUTPATIENT
Start: 2024-10-29 | End: 2024-10-29 | Stop reason: HOSPADM

## 2024-10-29 RX ORDER — LIDOCAINE HYDROCHLORIDE 10 MG/ML
INJECTION, SOLUTION EPIDURAL; INFILTRATION; INTRACAUDAL; PERINEURAL AS NEEDED
Status: DISCONTINUED | OUTPATIENT
Start: 2024-10-29 | End: 2024-10-29 | Stop reason: HOSPADM

## 2024-10-29 RX ORDER — MUPIROCIN 20 MG/G
1 OINTMENT TOPICAL ONCE
Status: COMPLETED | OUTPATIENT
Start: 2024-10-29 | End: 2024-10-29

## 2024-10-29 RX ORDER — CEFAZOLIN SODIUM 1 G/50ML
1 SOLUTION INTRAVENOUS ONCE
Status: COMPLETED | OUTPATIENT
Start: 2024-10-29 | End: 2024-10-29

## 2024-10-29 ASSESSMENT — PAIN SCALES - GENERAL
PAINLEVEL_OUTOF10: 0 - NO PAIN

## 2024-10-29 ASSESSMENT — PAIN - FUNCTIONAL ASSESSMENT
PAIN_FUNCTIONAL_ASSESSMENT: 0-10

## 2024-10-29 ASSESSMENT — COLUMBIA-SUICIDE SEVERITY RATING SCALE - C-SSRS
1. IN THE PAST MONTH, HAVE YOU WISHED YOU WERE DEAD OR WISHED YOU COULD GO TO SLEEP AND NOT WAKE UP?: NO
2. HAVE YOU ACTUALLY HAD ANY THOUGHTS OF KILLING YOURSELF?: NO
6. HAVE YOU EVER DONE ANYTHING, STARTED TO DO ANYTHING, OR PREPARED TO DO ANYTHING TO END YOUR LIFE?: NO

## 2024-11-04 DIAGNOSIS — I48.19 PERSISTENT ATRIAL FIBRILLATION (MULTI): ICD-10-CM

## 2024-11-04 NOTE — TELEPHONE ENCOUNTER
Received request for prescription refill for patient.  Patient follows with Dr. José Miguel Peters MD     Request is for Eliquis   Is patient currently on medication- yes    Last OV- 10/3/24  Next OV- 2/26/25    Pended for signing and sent to provider.

## 2024-11-05 ENCOUNTER — APPOINTMENT (OUTPATIENT)
Dept: CARDIOLOGY | Facility: CLINIC | Age: 84
End: 2024-11-05
Payer: MEDICARE

## 2024-11-05 DIAGNOSIS — R55 NEAR SYNCOPE: ICD-10-CM

## 2024-11-05 DIAGNOSIS — I49.9 VENTRICULAR ARRHYTHMIA: ICD-10-CM

## 2024-11-05 DIAGNOSIS — I47.29 NSVT (NONSUSTAINED VENTRICULAR TACHYCARDIA) (MULTI): ICD-10-CM

## 2024-11-05 NOTE — PROGRESS NOTES
Pt. here for wound check of loop implant by Dr. Peters  on 10/29/24 at [facility]. Sternal area is well approximated with no erythema or drainage. Pt. denies any fever or chills. Temp 98.6

## 2024-11-15 ENCOUNTER — HOSPITAL ENCOUNTER (OUTPATIENT)
Dept: CARDIOLOGY | Facility: HOSPITAL | Age: 84
Discharge: HOME | End: 2024-11-15
Payer: MEDICARE

## 2024-11-15 DIAGNOSIS — R55 SYNCOPE AND COLLAPSE: ICD-10-CM

## 2024-11-15 DIAGNOSIS — Z95.818 STATUS POST PLACEMENT OF IMPLANTABLE LOOP RECORDER: ICD-10-CM

## 2024-12-06 ENCOUNTER — HOSPITAL ENCOUNTER (OUTPATIENT)
Dept: CARDIOLOGY | Facility: HOSPITAL | Age: 84
Discharge: HOME | End: 2024-12-06
Payer: MEDICARE

## 2024-12-06 DIAGNOSIS — R55 SYNCOPE AND COLLAPSE: ICD-10-CM

## 2024-12-06 DIAGNOSIS — Z95.818 STATUS POST PLACEMENT OF IMPLANTABLE LOOP RECORDER: ICD-10-CM

## 2024-12-06 PROCEDURE — 93298 REM INTERROG DEV EVAL SCRMS: CPT | Performed by: INTERNAL MEDICINE

## 2024-12-06 PROCEDURE — 93298 REM INTERROG DEV EVAL SCRMS: CPT

## 2025-01-10 ENCOUNTER — HOSPITAL ENCOUNTER (OUTPATIENT)
Dept: CARDIOLOGY | Facility: HOSPITAL | Age: 85
Discharge: HOME | End: 2025-01-10
Payer: MEDICARE

## 2025-01-10 DIAGNOSIS — R55 SYNCOPE AND COLLAPSE: ICD-10-CM

## 2025-01-10 DIAGNOSIS — Z95.818 STATUS POST PLACEMENT OF IMPLANTABLE LOOP RECORDER: ICD-10-CM

## 2025-01-10 PROCEDURE — 93298 REM INTERROG DEV EVAL SCRMS: CPT

## 2025-01-10 PROCEDURE — 93298 REM INTERROG DEV EVAL SCRMS: CPT | Performed by: INTERNAL MEDICINE

## 2025-01-29 ENCOUNTER — OFFICE VISIT (OUTPATIENT)
Dept: PULMONOLOGY | Age: 85
End: 2025-01-29

## 2025-01-29 VITALS
HEART RATE: 67 BPM | DIASTOLIC BLOOD PRESSURE: 70 MMHG | SYSTOLIC BLOOD PRESSURE: 116 MMHG | BODY MASS INDEX: 27.84 KG/M2 | OXYGEN SATURATION: 97 % | WEIGHT: 194 LBS

## 2025-01-29 DIAGNOSIS — J44.9 CHRONIC OBSTRUCTIVE PULMONARY DISEASE, UNSPECIFIED COPD TYPE (HCC): Primary | ICD-10-CM

## 2025-01-29 DIAGNOSIS — R09.02 HYPOXIA: ICD-10-CM

## 2025-01-29 ASSESSMENT — ENCOUNTER SYMPTOMS
NAUSEA: 0
ABDOMINAL PAIN: 0
DIARRHEA: 0
EYE ITCHING: 0
VOMITING: 0
SHORTNESS OF BREATH: 1
COUGH: 0
RHINORRHEA: 0
VOICE CHANGE: 0
CHEST TIGHTNESS: 0
SORE THROAT: 0
WHEEZING: 0

## 2025-01-29 NOTE — PROGRESS NOTES
Subjective:             Eagle Duenas is a 84 y.o. male who complains today of:     Chief Complaint   Patient presents with    Follow-up     4m f/u on COPD       HPI  He is on 3 lit With sleep , he does not use O2 during daytime.   He is on nebulizer with albuterol QID and pro air HFA prn.  C/o shortness of breath with exertion.    He has been having wheezing.   No chest congestion .  No Hemoptysis.No Chest tightness.  No Chest pain with radiation  or pleuritic pain.  No  leg edema.No orthopnea.No Fever or chills.  No Rhinorrhea and postnasal drip.       Allergies:  Patient has no known allergies.  No past medical history on file.  No past surgical history on file.  No family history on file.  Social History     Socioeconomic History    Marital status:      Spouse name: Not on file    Number of children: Not on file    Years of education: Not on file    Highest education level: Not on file   Occupational History    Not on file   Tobacco Use    Smoking status: Former     Current packs/day: 0.00     Types: Cigarettes     Quit date:      Years since quittin.    Smokeless tobacco: Former   Substance and Sexual Activity    Alcohol use: Not on file    Drug use: Not on file    Sexual activity: Not on file   Other Topics Concern    Not on file   Social History Narrative    Not on file     Social Determinants of Health     Financial Resource Strain: Not on file   Food Insecurity: Not on file   Transportation Needs: Not on file   Physical Activity: Not on file   Stress: Not on file   Social Connections: Not on file   Intimate Partner Violence: Not on file   Housing Stability: Not on file         Review of Systems   Constitutional:  Negative for chills, diaphoresis, fatigue and fever.   HENT:  Negative for congestion, mouth sores, nosebleeds, postnasal drip, rhinorrhea, sneezing, sore throat and voice change.    Eyes:  Negative for itching and visual disturbance.   Respiratory:  Positive for shortness of

## 2025-02-12 ENCOUNTER — APPOINTMENT (OUTPATIENT)
Dept: CARDIOLOGY | Facility: CLINIC | Age: 85
End: 2025-02-12
Payer: MEDICARE

## 2025-02-12 ENCOUNTER — APPOINTMENT (OUTPATIENT)
Dept: CARDIOLOGY | Facility: HOSPITAL | Age: 85
End: 2025-02-12
Payer: MEDICARE

## 2025-02-14 ENCOUNTER — HOSPITAL ENCOUNTER (OUTPATIENT)
Dept: CARDIOLOGY | Facility: HOSPITAL | Age: 85
Discharge: HOME | End: 2025-02-14
Payer: MEDICARE

## 2025-02-14 DIAGNOSIS — Z95.818 STATUS POST PLACEMENT OF IMPLANTABLE LOOP RECORDER: ICD-10-CM

## 2025-02-14 DIAGNOSIS — R55 SYNCOPE AND COLLAPSE: ICD-10-CM

## 2025-02-14 PROCEDURE — 93298 REM INTERROG DEV EVAL SCRMS: CPT

## 2025-02-14 PROCEDURE — 93298 REM INTERROG DEV EVAL SCRMS: CPT | Performed by: INTERNAL MEDICINE

## 2025-02-17 ENCOUNTER — APPOINTMENT (OUTPATIENT)
Dept: CARDIOLOGY | Facility: CLINIC | Age: 85
End: 2025-02-17
Payer: MEDICARE

## 2025-02-17 VITALS
SYSTOLIC BLOOD PRESSURE: 122 MMHG | DIASTOLIC BLOOD PRESSURE: 68 MMHG | HEIGHT: 69 IN | HEART RATE: 57 BPM | WEIGHT: 197.6 LBS | BODY MASS INDEX: 29.27 KG/M2

## 2025-02-17 DIAGNOSIS — I25.10 ATHEROSCLEROSIS OF NATIVE CORONARY ARTERY OF NATIVE HEART WITHOUT ANGINA PECTORIS: Primary | ICD-10-CM

## 2025-02-17 DIAGNOSIS — I10 PRIMARY HYPERTENSION: ICD-10-CM

## 2025-02-17 DIAGNOSIS — J44.9 CHRONIC OBSTRUCTIVE PULMONARY DISEASE, UNSPECIFIED COPD TYPE (MULTI): ICD-10-CM

## 2025-02-17 DIAGNOSIS — I48.0 PAROXYSMAL ATRIAL FIBRILLATION (MULTI): ICD-10-CM

## 2025-02-17 DIAGNOSIS — E78.2 MIXED HYPERLIPIDEMIA: ICD-10-CM

## 2025-02-17 DIAGNOSIS — I49.3 FREQUENT PVCS: ICD-10-CM

## 2025-02-17 PROCEDURE — 3078F DIAST BP <80 MM HG: CPT | Performed by: INTERNAL MEDICINE

## 2025-02-17 PROCEDURE — 99214 OFFICE O/P EST MOD 30 MIN: CPT | Performed by: INTERNAL MEDICINE

## 2025-02-17 PROCEDURE — 1036F TOBACCO NON-USER: CPT | Performed by: INTERNAL MEDICINE

## 2025-02-17 PROCEDURE — 3074F SYST BP LT 130 MM HG: CPT | Performed by: INTERNAL MEDICINE

## 2025-02-17 PROCEDURE — 1159F MED LIST DOCD IN RCRD: CPT | Performed by: INTERNAL MEDICINE

## 2025-02-17 NOTE — PROGRESS NOTES
Patient:  Oscar Chavez  YOB: 1940  MRN: [id number]       HPI:       Oscar Chavez is a 84 y.o. male who returns today for cardiac follow-up.  He has a history of nonischemic dilated cardiomyopathy. His ejection fractions were as low as 30% with most recent studies showing an LVEF of 55-60%. Remote cardiac catheterization in 2001 revealed 40-60% mid LAD with 10-20% RCA and luminal irregularities of circumflex EF was 30-35% at that time. A CT angiogram in 2009 showed 50% LAD stenosis with otherwise minimal disease. He has essential hypertension and hyperlipidemia. In December 2016 he underwent resection of an intercerebral aneurysm after presenting with an episode of severe vertigo per Dr. Rodger Sarmiento at [facility]. His postop course was complicated by a subdural hematoma for which he underwent redo craniotomy and drainage. He had initial expressive aphasia which normalized.      A Lexiscan myocardial perfusion study on June 8, 2021 was negative for ischemia or infarction.  The study was done for evaluation of shortness of breath.  There was some diaphragmatic attenuation artifact. Calculated LV ejection fraction was 56%. He underwent PFTs and was noted to have mild obstructive disease.  He was seen by Dr. Johns. He was placed on nocturnal oxygen and given albuterol.  He was diagnosed with COPD.      At the time of a previous visit he was noted to have some irregularities of his heart rhythm.  EKG in the office showed normal sinus rhythm with PVCs versus PACs with aberrant conduction.  He was started back on for Toprol-XL 25 mg daily.  He was also advised to start back on simvastatin.  An echocardiogram on December 18, 2023 showed estimated LV ejection fraction of 55 to 60%.  There was mild (1+) mitral and tricuspid regurgitation with estimated RVSP 21 mmHg. The ascending aorta measured 3.8 cm.  A 24-hour Holter monitor on December 18, 2023 showed normal sinus rhythm with PVCs  comprising approximately 10% of the overall rhythm.  There were periods of ventricular bigeminy.  There was one 9 beat run of atrial tachycardia at 154 bpm.  He was reported to be in atrial fibrillation approximately 4% of the time.  Review of the rhythm strips shows that the majority of time in atrial fibrillation was reported there were P waves consistent with sinus rhythm or atrial tachycardia.  He did have a few relatively brief episodes of atrial fibrillation however.  A cardiac MRI on February 5, 2014 showed a calculated LV ejection fraction 45%.  The basal anterior septum measured 14 mm.  Delayed enhancement revealed transmural myocardial infarction of the basal mid to distal inferolateral wall.  There was transmural myocardial infarction of the basal mid anterolateral wall.  Calculated RV ejection fraction was 56%.  No evidence of ARVC.     A 30-day event monitor April 11, 2024 showed normal sinus rhythm with periods of sinus bradycardia.  There were 4 patient triggered events correlating with normal sinus rhythm, sinus bradycardia, and some PVCs.  There were 4 triggered events showing brief runs of atrial fibrillation.       He had a [employer] Linq loop recorder implanted by Dr. Peters on October 29, 2024.  Most recent device interrogation from January 7, 2025 to February Levan, 2025 showed normal sinus rhythm with 0.8% PVCs.  No atrial fibrillation.  No significant [place] or tachyarhythmias.     He has been doing well since his last visit.  He remains reasonably active without limitations.   He denies any chest discomfort. He denies any orthopnea, PND, or increasing peripheral edema. He denies any palpitations, lightheadedness, near-syncope, or syncope. He denies any fever, chills, or cough. He denies any nausea, vomiting, or diaphoresis. He denies any hemoptysis, hematemesis, melena, or hematochezia. His blood pressures are well controlled.  He is currently free of any anginal or CHF symptoms.  He will  continue anticoagulation with apixaban 5 mg twice daily.  Continue Toprol-XL 50 mg daily.  Other details as noted below.     The above portion of this note was dictated by me using voice recognition software. I personally performed the services described in the documentation. The scribe entering the documentation below was in my presence. I affirm that the information is both accurate and complete.      Objective:     Vitals:    02/17/25 1337   BP: 122/68   Pulse: 57       Wt Readings from Last 4 Encounters:   02/17/25 89.6 kg (197 lb 9.6 oz)   10/29/24 87.3 kg (192 lb 7.4 oz)   10/03/24 85.5 kg (188 lb 9.6 oz)   08/19/24 85 kg (187 lb 6.4 oz)       Allergies:     No Known Allergies       Medications:     Current Outpatient Medications   Medication Instructions    albuterol 90 mcg/actuation inhaler 2 puffs, Every 4 hours PRN    apixaban (ELIQUIS) 5 mg, oral, 2 times daily    clobetasol (Temovate) 0.05 % external solution 2 times daily    ketoconazole (NIZOral) 2 % shampoo Daily PRN    metoprolol succinate XL (TOPROL-XL) 50 mg, oral, Nightly       Physical Examination:   GENERAL:  Well developed, well nourished, in no acute distress.  CHEST:  Symmetric and nontender.  NEURO/PSYCH:  Alert and oriented times three with approppriate behavior and responses.  NECK:  Supple, no JVD, no bruit.  LUNGS:  Clear to auscultation bilaterally, normal respiratory effort.  HEART:  Rate and rhythm regular with no evident murmur, no gallop appreciated.        There are no rubs, clicks or heaves.  EXTREMITIES:  Warm with good color, no clubbing or cyanosis.  There is no edema noted.  PERIPHERAL VASCULAR:  Pulses present and equally palpable; 2+ throughout.      Lab:     CBC:   Lab Results   Component Value Date    WBC 8.1 10/29/2024    RBC 3.96 (L) 10/29/2024    HGB 12.2 (L) 10/29/2024    HCT 35.8 (L) 10/29/2024     10/29/2024        CMP:    Lab Results   Component Value Date     (L) 10/29/2024    K 3.8 10/29/2024    CL  103 10/29/2024    CO2 24 10/29/2024    BUN 17 10/29/2024    CREATININE 1.05 10/29/2024    GLUCOSE 91 10/29/2024    CALCIUM 8.8 10/29/2024       Lipid Profile:    Lab Results   Component Value Date    TRIG 280 (H) 12/14/2021    HDL 53.0 12/14/2021       BMP:  Lab Results   Component Value Date     (L) 10/29/2024     10/03/2024     08/19/2024    K 3.8 10/29/2024    K 4.5 10/03/2024    K 4.5 08/19/2024     10/29/2024     10/03/2024     08/19/2024    CO2 24 10/29/2024    CO2 30 10/03/2024    CO2 30 08/19/2024    BUN 17 10/29/2024    BUN 21 10/03/2024    BUN 14 08/19/2024    CREATININE 1.05 10/29/2024    CREATININE 1.13 10/03/2024    CREATININE 1.12 08/19/2024       CBC:  Lab Results   Component Value Date    WBC 8.1 10/29/2024    WBC 7.6 08/19/2024    WBC 9.2 12/14/2021    RBC 3.96 (L) 10/29/2024    RBC 4.29 (L) 08/19/2024    RBC 4.59 12/14/2021    HGB 12.2 (L) 10/29/2024    HGB 13.2 (L) 08/19/2024    HGB 14.3 12/14/2021    HCT 35.8 (L) 10/29/2024    HCT 38.9 (L) 08/19/2024    HCT 43.2 12/14/2021    MCV 90 10/29/2024    MCV 91 08/19/2024    MCV 94 12/14/2021    MCH 30.8 10/29/2024    MCH 30.8 08/19/2024    MCHC 34.1 10/29/2024    MCHC 33.9 08/19/2024    MCHC 33.1 12/14/2021    RDW 13.9 10/29/2024    RDW 14.6 (H) 08/19/2024    RDW 14.2 12/14/2021     10/29/2024     08/19/2024     12/14/2021       Hepatic Function Panel:    Lab Results   Component Value Date    ALKPHOS 56 08/19/2024    ALT 16 08/19/2024    AST 21 08/19/2024    PROT 6.8 08/19/2024    BILITOT 0.6 08/19/2024       PT/INR:    Lab Results   Component Value Date    PROTIME 10.9 10/28/2024    INR 1.0 10/28/2024       Diagnostic Studies:     === 06/08/21 ===    - Impression -  Borderline abnormal Lexiscan Myoview cardiac perfusion stress test.  No evidence of myocardial ischemia by perfusion imaging.  Small fixed defect posterolateral wall which may well be diaphragm  attenuation in that there is some wall  thickening in this region.  Normal left ventricular systolic function.  Left ventricular ejection fraction 56 %.  Overall low risk pattern stress test. Although initial exercise did  not achieve 85% target heart rate he did achieve 7 M ET of exercise  which is excellent for his age and gender. This was not associated  with EKG changes or angina. Lexiscan showed no inducible ischemia  with preserved LV function. There is a question of posterolateral  infarct versus soft tissue attenuation but this involves throughout  small portion of the myocardium..      Problem List:     Patient Active Problem List   Diagnosis    Acute bronchitis    Atherosclerosis of native coronary artery without angina pectoris    Chronic obstructive pulmonary disease (Multi)    Elevated blood sugar    Esophageal reflux    Fatigue    Hyperlipidemia    Hypertension, essential, benign    Intracranial aneurysm (HHS-HCC)    Nonischemic cardiomyopathy (Multi)    SOB (shortness of breath) on exertion    Subdural hematoma (Multi)    Dizzinesses    Vertigo    Frequent PVCs    NSVT (nonsustained ventricular tachycardia) (Multi)    PAF (paroxysmal atrial fibrillation) (Multi)    Paroxysmal atrial fibrillation (Multi)    Ischemic dilated cardiomyopathy (Multi)    Near syncope    Atrial fibrillation (Multi)    PVC (premature ventricular contraction)       Asessment:     Problem List Items Addressed This Visit             ICD-10-CM    Atherosclerosis of native coronary artery without angina pectoris - Primary I25.10    Chronic obstructive pulmonary disease (Multi) J44.9    Hyperlipidemia E78.5    Primary hypertension I10    Frequent PVCs I49.3    Relevant Orders    Follow Up In Cardiology    Basic Metabolic Panel    CBC    Atrial fibrillation (Multi) I48.91

## 2025-02-17 NOTE — PATIENT INSTRUCTIONS
Follow up 6 months  Non-fasting labs to be done approximately 1 week prior to next appointment. We are a paperless office.  The order is in the computer and you can go to any  lab to have done. You can always ask for order to be printed if you'd like to go to outside lab.      DID YOU KNOW  We have a pharmacy here in the [facility].  They can fill all prescriptions, not just cardiac medications.  Prescriptions from other pharmacies can easily be transferred to the  pharmacy by the  pharmacist on site.   pharmacies offer FREE HOME DELIVERY on medications to anywhere in [place]. They can sync your medications. Typically prescriptions can be ready in 10 - 15 minutes. If pharmacy is unable to fill your  prescription or if cost is more than your paying now the Pharmacist can easily transfer back to your Pharmacy of choice. Pharmacy phone # [phone].     Please bring all medicines, vitamins, and herbal supplements with you in original bottles to every appointment!!!!    Prescriptions will not be filled unless you are compliant with your follow up appointments or have a follow up appointment scheduled as per instruction of your physician. Refills should be requested at the time of your visit.

## 2025-02-26 ENCOUNTER — HOSPITAL ENCOUNTER (OUTPATIENT)
Dept: CARDIOLOGY | Facility: HOSPITAL | Age: 85
Discharge: HOME | End: 2025-02-26
Payer: MEDICARE

## 2025-02-26 ENCOUNTER — APPOINTMENT (OUTPATIENT)
Dept: CARDIOLOGY | Facility: CLINIC | Age: 85
End: 2025-02-26
Payer: MEDICARE

## 2025-02-26 VITALS
HEIGHT: 69 IN | BODY MASS INDEX: 28.88 KG/M2 | DIASTOLIC BLOOD PRESSURE: 60 MMHG | WEIGHT: 195 LBS | SYSTOLIC BLOOD PRESSURE: 94 MMHG | HEART RATE: 52 BPM

## 2025-02-26 DIAGNOSIS — R94.31 ABNORMAL EKG: ICD-10-CM

## 2025-02-26 DIAGNOSIS — I48.19 PERSISTENT ATRIAL FIBRILLATION (MULTI): ICD-10-CM

## 2025-02-26 DIAGNOSIS — I49.3 FREQUENT PVCS: ICD-10-CM

## 2025-02-26 DIAGNOSIS — I48.0 PAROXYSMAL ATRIAL FIBRILLATION (MULTI): ICD-10-CM

## 2025-02-26 DIAGNOSIS — Z95.818 STATUS POST PLACEMENT OF IMPLANTABLE LOOP RECORDER: Primary | ICD-10-CM

## 2025-02-26 DIAGNOSIS — Z87.891 FORMER SMOKER: ICD-10-CM

## 2025-02-26 DIAGNOSIS — R55 SYNCOPE AND COLLAPSE: ICD-10-CM

## 2025-02-26 DIAGNOSIS — Z95.818 STATUS POST PLACEMENT OF IMPLANTABLE LOOP RECORDER: ICD-10-CM

## 2025-02-26 PROCEDURE — 93291 INTERROG DEV EVAL SCRMS IP: CPT

## 2025-02-26 PROCEDURE — 3074F SYST BP LT 130 MM HG: CPT | Performed by: INTERNAL MEDICINE

## 2025-02-26 PROCEDURE — 93291 INTERROG DEV EVAL SCRMS IP: CPT | Performed by: INTERNAL MEDICINE

## 2025-02-26 PROCEDURE — 1036F TOBACCO NON-USER: CPT | Performed by: INTERNAL MEDICINE

## 2025-02-26 PROCEDURE — 93000 ELECTROCARDIOGRAM COMPLETE: CPT | Mod: DISTINCT PROCEDURAL SERVICE | Performed by: INTERNAL MEDICINE

## 2025-02-26 PROCEDURE — 99214 OFFICE O/P EST MOD 30 MIN: CPT | Performed by: INTERNAL MEDICINE

## 2025-02-26 PROCEDURE — 3078F DIAST BP <80 MM HG: CPT | Performed by: INTERNAL MEDICINE

## 2025-02-26 PROCEDURE — 1159F MED LIST DOCD IN RCRD: CPT | Performed by: INTERNAL MEDICINE

## 2025-02-26 RX ORDER — METOPROLOL SUCCINATE 50 MG/1
50 TABLET, EXTENDED RELEASE ORAL NIGHTLY
Qty: 90 TABLET | Refills: 3 | Status: SHIPPED | OUTPATIENT
Start: 2025-02-26 | End: 2026-02-26

## 2025-02-26 ASSESSMENT — ENCOUNTER SYMPTOMS
WHEEZING: 0
CLAUDICATION: 0
IRREGULAR HEARTBEAT: 0
ORTHOPNEA: 0
SNORING: 0
NEAR-SYNCOPE: 0
CARDIOVASCULAR NEGATIVE: 1
SHORTNESS OF BREATH: 0
COUGH: 0
PND: 0
SYNCOPE: 0

## 2025-02-26 NOTE — PATIENT INSTRUCTIONS
Follow up in 6 months with Evita and 12 months with Dr. Catalan Remote loop recorder checks will occur monthly.  In clinic loop recorder checks are to be done every 6 months, prior to your appointment, on the same day.   Continue same medications and treatments.   Patient educated on proper medication use.   Patient educated on risk factor modification.   Please bring any lab results from other providers / physicians to your next appointment.     Please bring all medicines, vitamins, and herbal supplements with you when you come to the office.     Prescriptions will not be filled unless you are compliant with your follow up appointments or have a follow up appointment scheduled as per instruction of your physician. Refills should be requested at the time of your visit.  IBRANDON LPN, AM SCRIBING FOR, AND IN THE PRESENCE OF DR. JASS CATALAN MD

## 2025-02-26 NOTE — PROGRESS NOTES
CARDIOLOGY OFFICE VISIT      CHIEF COMPLAINT  Chief Complaint   Patient presents with    Follow-up     Patient is present for 4 month follow up with device check          HISTORY OF PRESENT ILLNESS  HPI  84-year-old male with a past medical history of ischemic dilated cardiomyopathy with ejection fraction was low at 30% with echocardiogram in June 2018 showed left ventricular atrial fraction 55 to 60% an echocardiogram in 2023 shows left ventricular atrial fraction of 55 to 60%.  Remote cardiac catheterization in 2001 that revealed 40 to 60% mid LAD with 10 to 20% right coronary artery luminal irregularities of the circumflex with left ventricular ejection fraction of 30 to 35%.  CT angiogram in 2009 showed 50% stenosis of the LAD otherwise minimal disease.  History of hypertension, hyperlipidemia.     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  Stress test in June 2021 shows no evidence of ischemia with left ventricular action fraction 56%.  PFTs show mild obstructive disease.  A Holter monitor was ordered due to some irregular heartbeats.  Holter monitor showed underlying rhythm was sinus rhythm with occasional PVCs comprising approximately 10% of the total beats.  The morphology of the PVCs were polymorphic.  Also there were frequent brief episodes of nonsustained supraventricular tachycardia and some other rhythm strips are consistent actually with atrial fibrillation.  Vista of atrial fibrillation approximately 4% of the time.     A cardiac MRI was ordered in February 2014 that shows left ventricular ejection fraction 45% with basal anterior septum measuring 14 mm.  Delaying has been revealed transmural myocardial infarction in the basal mid to distal inferolateral wall and there was a transmural myocardial infarction of the basal mid anterolateral wall with right ventricular ejection fraction of 56%.        Echocardiogram 2023      CONCLUSIONS:   1. Left ventricular systolic function is normal with a 55-60% estimated ejection fraction.   2. Spectral Doppler shows an impaired relaxation pattern of left ventricular diastolic filling.   3. Mild (1+) mitral regurgitation.   4. Mild (1+) tricuspid regurgitation with estimated RVSP of 21 mmHg.   5. The ascending aorta measures 3.8 cm.   6. No previous available for comparison.        Holter monitor December 2023     Total of 8041 ventricular ectopic beats, comprising 9.8% of the total QRS complexes.  95 ventricular couplets  Several episodes of ventricular bigeminy  Supraventricular premature beats comprise 1.5% of total QRS complexes  Atrial runs, longest 9 beats at 5:59 PM fastest 154 beats at 2:11 AM.  Longest R to R interval 1.9 seconds  Patient was in atrial fibrillation 4.2% of the monitoring interval.  Ventricular rate was controlled in atrial fibrillation.  There was significant baseline artifact, and several strips were erased.     Summary:  1.  This is an abnormal Holter monitor  2.  Ventricular ectopy comprised 9.8% of the total QRS complexes  3.  Atrial fibrillation was noted, with controlled ventricular rate  4.  There were several runs of atrial tachycardia, short, no symptoms were reported, maximum ventricular rate 154 bpm at 2:11 AM.  5.  Left atrial abnormality and first-degree AV block are noted  6.  QRS appears widened.        MR cardiac morphology and function w and wo IV contrast  Result Date: 2/6/2024     1.  Normal left ventricular cavity size with mild-moderately depressed systolic function. Ejection fraction 45%. 2.   Asymmetric septal left ventricular hypertrophy. Basal anterior septum 1.4 cm. 3.  Delayed enhancement imaging reveals transmural myocardial infarction of the basal/mid/distal inferolateral wall. Subendocardial non transmural myocardial infarction of the basal/mid anterolateral wall. 4.  Normal right ventricular cavity size with preserved systolic function. RV  EF 56%. No CMR evidence of ARVC.     MACRO: None   Signed by: Fabrizio Iesha 2/6/2024 10:29 AM         === 06/08/21 ===  - Impression -  Borderline abnormal Lexiscan Myoview cardiac perfusion stress test.  No evidence of myocardial ischemia by perfusion imaging.  Small fixed defect posterolateral wall which may well be diaphragm  attenuation in that there is some wall thickening in this region.  Normal left ventricular systolic function.  Left ventricular ejection fraction 56 %.  Overall low risk pattern stress test. Although initial exercise did  not achieve 85% target heart rate he did achieve 7 M ET of exercise  which is excellent for his age and gender. This was not associated  with EKG changes or angina. Lexiscan showed no inducible ischemia  with preserved LV function. There is a question of posterolateral  infarct versus soft tissue attenuation but this involves throughout  small portion of the myocardium..     Patient was placed on Eliquis therapy .     Event monitor April 2024  This is an event monitor for 30 days from April 11, 2024 to May 11, 2024.     The underlying rhythm was sinus bradycardia rate of 49 bpm.     There were 4 patient triggered events and 8 auto triggered events.  All these events had no symptoms related.  All these events correlated with sinus rhythm-sinus bradycardia with PVCs.     There were 4 events related with atrial fibrillation.  The first 1 April 14, 2024 at 1:33 AM.  Rhythm strip was consistent with sinus bradycardia with atrial fibrillation at a rate of 56 bpm.     The second episode was April 23, 2024 at 12:38 AM.  Sinus bradycardia with atrial fibrillation rate 56 bpm.     The third episode was April 26, 2024 at 5:51 AM.  He was related with sinus bradycardia with atrial fibrillation rates for 48 bpm.     Therefore event was May 7, 2024 at 9:50 AM.  He was not auto trigger event.  Rhythm strip consistent with sinus bradycardia with atrial fibrillation rate of 52 bpm.   Clinical correlation is to be made    During the last visit in 2024, patient was complaining of 2 episodes of near syncope.  A loop recorder was implanted in 2024 with no complications.    So far he states that he has been doing well.  Denies any symptoms of chest pain or shortness breath or palpitations.    Loop recorder interrogation has been showing no significant atrial or ventricular arrhythmias except for 1 episode of atrial fibrillation 2024.  Hornick of atrial fibrillation 0.1% of the time.  Had 1 episode on November 10, 2024 at 0 0 hours 18 minutes lasting for an hour and 2 minutes of duration.  Rates up to 146 bpm.  Asymptomatic.    EKG performed today shows sinus bradycardia with first-degree AV block at a rate of 52 bpm QRS ration 108 ms QT corrected 414 ms.  Rhythm strip shows the same pattern.      Past Medical History  Past Medical History:   Diagnosis Date    Arrhythmia     CHF (congestive heart failure)     Coronary artery disease     Hyperlipidemia     Hypertension     Ischemic dilated cardiomyopathy (Multi)        Social History  Social History     Tobacco Use    Smoking status: Former     Current packs/day: 0.00     Types: Cigarettes     Quit date:      Years since quittin.1    Smokeless tobacco: Never   Substance Use Topics    Alcohol use: Not Currently    Drug use: Not Currently       Family History   No family history on file.     Allergies:  No Known Allergies     Outpatient Medications:  Current Outpatient Medications   Medication Instructions    albuterol 90 mcg/actuation inhaler 2 puffs, Every 4 hours PRN    apixaban (ELIQUIS) 5 mg, oral, 2 times daily    clobetasol (Temovate) 0.05 % external solution 2 times daily    ketoconazole (NIZOral) 2 % shampoo Daily PRN    metoprolol succinate XL (TOPROL-XL) 50 mg, oral, Nightly          REVIEW OF SYSTEMS  Review of Systems   Constitutional: Negative for malaise/fatigue.   Cardiovascular: Negative.  Negative  for claudication, cyanosis, irregular heartbeat, leg swelling, near-syncope, orthopnea, paroxysmal nocturnal dyspnea and syncope.   Respiratory:  Negative for cough, shortness of breath, snoring and wheezing.    All other systems reviewed and are negative.        VITALS  Vitals:    02/26/25 1109   BP: 94/60   Pulse: 52       PHYSICAL EXAM  Constitutional:       Appearance: Normal and healthy appearance. Well-developed, overweight and not in distress.      Comments: Left sided loop recorder site well healed   Neck:      Vascular: No JVR. JVD normal.   Pulmonary:      Effort: Pulmonary effort is normal.      Breath sounds: Normal breath sounds. No wheezing. No rhonchi. No rales.   Chest:      Chest wall: Not tender to palpatation.   Cardiovascular:      PMI at left midclavicular line. Normal rate. Regular rhythm. Normal S1. Normal S2.       Murmurs: There is no murmur.      No gallop.  No click. No rub.   Pulses:     Intact distal pulses.   Edema:     Peripheral edema absent.   Abdominal:      Tenderness: There is no abdominal tenderness.   Musculoskeletal: Normal range of motion.         General: No tenderness. Skin:     General: Skin is warm and dry.   Neurological:      General: No focal deficit present.      Mental Status: Alert and oriented to person, place and time.           ASSESSMENT AND PLAN  Clinical impression     1.  History of nonischemic cardiomyopathy with a left ventricular ejection fraction was low at 35% with recent echocardiogram showing left ventricle ejection fraction 45%  2.  History of coronary artery disease with mild to moderate disease per cardiac catheterization in 2001  3.  History of hypertension  4.  History of hyperlipidemia  5.  PVCs, frequent by Holter monitor in 2024  6.  Brief episodes of atrial fibrillation with episodes of rapid ventricular response, on beta-blocker therapy  7.  Long-term anticoagulant therapy with Eliquis  8.  Shortness of breath  9.  Dizziness after starting  beta-blocker therapy  10.  Congestive heart failure, new [place] Heart Association class II  11.  Near syncopal episodes      Plan recommendations    Patient is doing well from the electrophysiology standpoint patient has not had any significant bradycardia arrhythmias but there was episode of atrial fibrillation in November 10, 2024 lasting 1 hour of duration.  Will continue with observation for now.    Patient is on Eliquis therapy.    Follow my office every 6 months or sooner needed    Follow device clinic as scheduled.    Risk factor modification and lifestyle modification discussed with patient. Diet , exercise and hydration discussed with patient.    I have personally review with patient during this office visit, laboratory data, echocardiogram results, stress test results, Holter-event monitor results prior and after the last electrophysiology visit. All questions has been answered.    Please excuse any errors in grammar or translation related to this dictation.  Voice recognition software was utilized to prepare this document.

## 2025-04-04 ENCOUNTER — HOSPITAL ENCOUNTER (OUTPATIENT)
Dept: CARDIOLOGY | Facility: HOSPITAL | Age: 85
Discharge: HOME | End: 2025-04-04
Payer: MEDICARE

## 2025-04-04 DIAGNOSIS — Z95.818 STATUS POST PLACEMENT OF IMPLANTABLE LOOP RECORDER: ICD-10-CM

## 2025-04-04 DIAGNOSIS — R55 SYNCOPE AND COLLAPSE: ICD-10-CM

## 2025-04-09 ENCOUNTER — OFFICE VISIT (OUTPATIENT)
Age: 85
End: 2025-04-09
Payer: MEDICARE

## 2025-04-09 VITALS
BODY MASS INDEX: 28.7 KG/M2 | SYSTOLIC BLOOD PRESSURE: 116 MMHG | WEIGHT: 200 LBS | OXYGEN SATURATION: 96 % | HEART RATE: 50 BPM | DIASTOLIC BLOOD PRESSURE: 70 MMHG

## 2025-04-09 DIAGNOSIS — R09.02 HYPOXIA: ICD-10-CM

## 2025-04-09 DIAGNOSIS — J44.9 CHRONIC OBSTRUCTIVE PULMONARY DISEASE, UNSPECIFIED COPD TYPE (HCC): Primary | ICD-10-CM

## 2025-04-09 DIAGNOSIS — R06.02 SHORTNESS OF BREATH: ICD-10-CM

## 2025-04-09 PROCEDURE — G8427 DOCREV CUR MEDS BY ELIG CLIN: HCPCS | Performed by: INTERNAL MEDICINE

## 2025-04-09 PROCEDURE — 1159F MED LIST DOCD IN RCRD: CPT | Performed by: INTERNAL MEDICINE

## 2025-04-09 PROCEDURE — 99212 OFFICE O/P EST SF 10 MIN: CPT | Performed by: INTERNAL MEDICINE

## 2025-04-09 PROCEDURE — 4004F PT TOBACCO SCREEN RCVD TLK: CPT | Performed by: INTERNAL MEDICINE

## 2025-04-09 PROCEDURE — 3023F SPIROM DOC REV: CPT | Performed by: INTERNAL MEDICINE

## 2025-04-09 PROCEDURE — 99213 OFFICE O/P EST LOW 20 MIN: CPT | Performed by: INTERNAL MEDICINE

## 2025-04-09 PROCEDURE — G8417 CALC BMI ABV UP PARAM F/U: HCPCS | Performed by: INTERNAL MEDICINE

## 2025-04-09 PROCEDURE — 1123F ACP DISCUSS/DSCN MKR DOCD: CPT | Performed by: INTERNAL MEDICINE

## 2025-04-09 ASSESSMENT — ENCOUNTER SYMPTOMS
ABDOMINAL PAIN: 0
COUGH: 0
VOICE CHANGE: 0
NAUSEA: 0
SORE THROAT: 0
DIARRHEA: 0
RHINORRHEA: 0
VOMITING: 0
SHORTNESS OF BREATH: 1
WHEEZING: 1
CHEST TIGHTNESS: 0
EYE ITCHING: 0

## 2025-04-09 NOTE — PROGRESS NOTES
.  No Chest tightness.    - [place] [facility] [place] Does [place] apply [place] End date  04/9/2028  Dx. COPD  Dispense: 1 each; Refill: 0    2. Hypoxia  He is on 3 lit With sleep , Continue O2 to keep Spo2 90% or above      3. Shortness of breath  He having  Chronic shortness of breath which is likely due to COPD, continue  Bronchodilator as before. keep  Spo2 90% or above.      Return in about 4 months (around 8/9/2025).      Issa Lacy MD

## 2025-04-11 ENCOUNTER — HOSPITAL ENCOUNTER (OUTPATIENT)
Dept: CARDIOLOGY | Facility: HOSPITAL | Age: 85
Discharge: HOME | End: 2025-04-11
Payer: MEDICARE

## 2025-04-11 DIAGNOSIS — R55 SYNCOPE AND COLLAPSE: ICD-10-CM

## 2025-04-11 DIAGNOSIS — Z95.818 STATUS POST PLACEMENT OF IMPLANTABLE LOOP RECORDER: ICD-10-CM

## 2025-04-11 PROCEDURE — 93298 REM INTERROG DEV EVAL SCRMS: CPT

## 2025-04-18 ENCOUNTER — HOSPITAL ENCOUNTER (OUTPATIENT)
Dept: CARDIOLOGY | Facility: HOSPITAL | Age: 85
Discharge: HOME | End: 2025-04-18
Payer: MEDICARE

## 2025-04-18 DIAGNOSIS — R55 SYNCOPE AND COLLAPSE: ICD-10-CM

## 2025-04-18 DIAGNOSIS — Z95.818 STATUS POST PLACEMENT OF IMPLANTABLE LOOP RECORDER: ICD-10-CM

## 2025-05-09 ENCOUNTER — HOSPITAL ENCOUNTER (OUTPATIENT)
Dept: CARDIOLOGY | Facility: HOSPITAL | Age: 85
Discharge: HOME | End: 2025-05-09
Payer: MEDICARE

## 2025-05-09 DIAGNOSIS — R55 SYNCOPE AND COLLAPSE: ICD-10-CM

## 2025-05-09 DIAGNOSIS — Z95.818 STATUS POST PLACEMENT OF IMPLANTABLE LOOP RECORDER: ICD-10-CM

## 2025-05-16 ENCOUNTER — HOSPITAL ENCOUNTER (OUTPATIENT)
Dept: CARDIOLOGY | Facility: HOSPITAL | Age: 85
Discharge: HOME | End: 2025-05-16
Payer: MEDICARE

## 2025-05-16 DIAGNOSIS — Z95.818 STATUS POST PLACEMENT OF IMPLANTABLE LOOP RECORDER: ICD-10-CM

## 2025-05-16 DIAGNOSIS — R55 SYNCOPE AND COLLAPSE: ICD-10-CM

## 2025-05-16 PROCEDURE — 93298 REM INTERROG DEV EVAL SCRMS: CPT

## 2025-05-30 ENCOUNTER — HOSPITAL ENCOUNTER (OUTPATIENT)
Dept: CARDIOLOGY | Facility: HOSPITAL | Age: 85
Discharge: HOME | End: 2025-05-30
Payer: MEDICARE

## 2025-05-30 DIAGNOSIS — Z95.818 STATUS POST PLACEMENT OF IMPLANTABLE LOOP RECORDER: ICD-10-CM

## 2025-05-30 DIAGNOSIS — R55 SYNCOPE AND COLLAPSE: ICD-10-CM

## 2025-06-13 ENCOUNTER — HOSPITAL ENCOUNTER (OUTPATIENT)
Dept: CARDIOLOGY | Facility: HOSPITAL | Age: 85
Discharge: HOME | End: 2025-06-13
Payer: MEDICARE

## 2025-06-13 DIAGNOSIS — Z95.818 STATUS POST PLACEMENT OF IMPLANTABLE LOOP RECORDER: ICD-10-CM

## 2025-06-13 DIAGNOSIS — R55 SYNCOPE AND COLLAPSE: ICD-10-CM

## 2025-06-20 ENCOUNTER — HOSPITAL ENCOUNTER (OUTPATIENT)
Dept: CARDIOLOGY | Facility: HOSPITAL | Age: 85
Discharge: HOME | End: 2025-06-20
Payer: MEDICARE

## 2025-06-20 DIAGNOSIS — Z95.818 STATUS POST PLACEMENT OF IMPLANTABLE LOOP RECORDER: ICD-10-CM

## 2025-06-20 DIAGNOSIS — R55 SYNCOPE AND COLLAPSE: ICD-10-CM

## 2025-06-20 PROCEDURE — 93298 REM INTERROG DEV EVAL SCRMS: CPT | Performed by: INTERNAL MEDICINE

## 2025-06-20 PROCEDURE — 93298 REM INTERROG DEV EVAL SCRMS: CPT

## 2025-07-03 ENCOUNTER — HOSPITAL ENCOUNTER (OUTPATIENT)
Dept: CARDIOLOGY | Facility: HOSPITAL | Age: 85
Discharge: HOME | End: 2025-07-03
Payer: MEDICARE

## 2025-07-03 DIAGNOSIS — R55 SYNCOPE AND COLLAPSE: ICD-10-CM

## 2025-07-03 DIAGNOSIS — Z95.818 STATUS POST PLACEMENT OF IMPLANTABLE LOOP RECORDER: ICD-10-CM

## 2025-07-25 ENCOUNTER — HOSPITAL ENCOUNTER (OUTPATIENT)
Dept: CARDIOLOGY | Facility: HOSPITAL | Age: 85
Discharge: HOME | End: 2025-07-25
Payer: MEDICARE

## 2025-07-25 DIAGNOSIS — R55 SYNCOPE AND COLLAPSE: ICD-10-CM

## 2025-07-25 DIAGNOSIS — Z95.818 STATUS POST PLACEMENT OF IMPLANTABLE LOOP RECORDER: ICD-10-CM

## 2025-07-25 PROCEDURE — 93298 REM INTERROG DEV EVAL SCRMS: CPT

## 2025-08-11 ENCOUNTER — APPOINTMENT (OUTPATIENT)
Dept: CARDIOLOGY | Facility: CLINIC | Age: 85
End: 2025-08-11
Payer: MEDICARE

## 2025-08-13 ENCOUNTER — OFFICE VISIT (OUTPATIENT)
Age: 85
End: 2025-08-13
Payer: MEDICARE

## 2025-08-13 VITALS
BODY MASS INDEX: 27.26 KG/M2 | OXYGEN SATURATION: 96 % | SYSTOLIC BLOOD PRESSURE: 104 MMHG | DIASTOLIC BLOOD PRESSURE: 66 MMHG | WEIGHT: 190 LBS | HEART RATE: 50 BPM

## 2025-08-13 DIAGNOSIS — R06.02 SHORTNESS OF BREATH: ICD-10-CM

## 2025-08-13 DIAGNOSIS — R09.02 HYPOXIA: ICD-10-CM

## 2025-08-13 DIAGNOSIS — J44.9 CHRONIC OBSTRUCTIVE PULMONARY DISEASE, UNSPECIFIED COPD TYPE (HCC): Primary | ICD-10-CM

## 2025-08-13 PROCEDURE — 1123F ACP DISCUSS/DSCN MKR DOCD: CPT | Performed by: INTERNAL MEDICINE

## 2025-08-13 PROCEDURE — 1159F MED LIST DOCD IN RCRD: CPT | Performed by: INTERNAL MEDICINE

## 2025-08-13 PROCEDURE — G8417 CALC BMI ABV UP PARAM F/U: HCPCS | Performed by: INTERNAL MEDICINE

## 2025-08-13 PROCEDURE — 99213 OFFICE O/P EST LOW 20 MIN: CPT | Performed by: INTERNAL MEDICINE

## 2025-08-13 PROCEDURE — 99212 OFFICE O/P EST SF 10 MIN: CPT | Performed by: INTERNAL MEDICINE

## 2025-08-13 PROCEDURE — 4004F PT TOBACCO SCREEN RCVD TLK: CPT | Performed by: INTERNAL MEDICINE

## 2025-08-13 PROCEDURE — 3023F SPIROM DOC REV: CPT | Performed by: INTERNAL MEDICINE

## 2025-08-13 PROCEDURE — G8427 DOCREV CUR MEDS BY ELIG CLIN: HCPCS | Performed by: INTERNAL MEDICINE

## 2025-08-13 ASSESSMENT — ENCOUNTER SYMPTOMS
SORE THROAT: 0
VOICE CHANGE: 0
SHORTNESS OF BREATH: 1
EYE ITCHING: 0
RHINORRHEA: 0
NAUSEA: 0
WHEEZING: 0
CHEST TIGHTNESS: 0
ABDOMINAL PAIN: 0
VOMITING: 0
DIARRHEA: 0
COUGH: 0

## 2025-08-17 DIAGNOSIS — I49.3 FREQUENT PVCS: ICD-10-CM

## 2025-08-27 ENCOUNTER — APPOINTMENT (OUTPATIENT)
Dept: CARDIOLOGY | Facility: CLINIC | Age: 85
End: 2025-08-27
Payer: MEDICARE

## 2025-08-29 ENCOUNTER — APPOINTMENT (OUTPATIENT)
Dept: CARDIOLOGY | Facility: HOSPITAL | Age: 85
End: 2025-08-29
Payer: MEDICARE

## 2025-08-29 ENCOUNTER — HOSPITAL ENCOUNTER (OUTPATIENT)
Dept: CARDIOLOGY | Facility: HOSPITAL | Age: 85
Discharge: HOME | End: 2025-08-29
Payer: MEDICARE

## 2025-08-29 DIAGNOSIS — R55 SYNCOPE AND COLLAPSE: ICD-10-CM

## 2025-08-29 DIAGNOSIS — Z95.818 STATUS POST PLACEMENT OF IMPLANTABLE LOOP RECORDER: ICD-10-CM

## 2025-08-29 PROCEDURE — 93298 REM INTERROG DEV EVAL SCRMS: CPT

## 2025-10-13 ENCOUNTER — APPOINTMENT (OUTPATIENT)
Dept: CARDIOLOGY | Facility: CLINIC | Age: 85
End: 2025-10-13
Payer: MEDICARE

## (undated) DEVICE — DRESSING, AQUACEL AG, HYDROFIBER W/SILVER, 3.5 X 3.75 IN